# Patient Record
Sex: MALE | Race: WHITE | NOT HISPANIC OR LATINO | Employment: OTHER | URBAN - METROPOLITAN AREA
[De-identification: names, ages, dates, MRNs, and addresses within clinical notes are randomized per-mention and may not be internally consistent; named-entity substitution may affect disease eponyms.]

---

## 2020-03-05 ENCOUNTER — APPOINTMENT (OUTPATIENT)
Dept: RADIOLOGY | Facility: MEDICAL CENTER | Age: 40
End: 2020-03-05
Attending: EMERGENCY MEDICINE
Payer: MEDICARE

## 2020-03-05 ENCOUNTER — HOSPITAL ENCOUNTER (EMERGENCY)
Facility: MEDICAL CENTER | Age: 40
End: 2020-03-05
Attending: EMERGENCY MEDICINE
Payer: MEDICARE

## 2020-03-05 VITALS
TEMPERATURE: 98.8 F | HEART RATE: 99 BPM | SYSTOLIC BLOOD PRESSURE: 135 MMHG | BODY MASS INDEX: 35.29 KG/M2 | OXYGEN SATURATION: 94 % | WEIGHT: 305 LBS | RESPIRATION RATE: 18 BRPM | HEIGHT: 78 IN | DIASTOLIC BLOOD PRESSURE: 89 MMHG

## 2020-03-05 DIAGNOSIS — J10.1 INFLUENZA A: ICD-10-CM

## 2020-03-05 LAB
ANION GAP SERPL CALC-SCNC: 13 MMOL/L (ref 0–11.9)
BASOPHILS # BLD AUTO: 0.9 % (ref 0–1.8)
BASOPHILS # BLD: 0.07 K/UL (ref 0–0.12)
BUN SERPL-MCNC: 12 MG/DL (ref 8–22)
CALCIUM SERPL-MCNC: 9.1 MG/DL (ref 8.5–10.5)
CHLORIDE SERPL-SCNC: 102 MMOL/L (ref 96–112)
CO2 SERPL-SCNC: 22 MMOL/L (ref 20–33)
CREAT SERPL-MCNC: 1.15 MG/DL (ref 0.5–1.4)
EKG IMPRESSION: NORMAL
EOSINOPHIL # BLD AUTO: 0.15 K/UL (ref 0–0.51)
EOSINOPHIL NFR BLD: 1.8 % (ref 0–6.9)
ERYTHROCYTE [DISTWIDTH] IN BLOOD BY AUTOMATED COUNT: 42.1 FL (ref 35.9–50)
FLUAV RNA SPEC QL NAA+PROBE: POSITIVE
FLUBV RNA SPEC QL NAA+PROBE: NEGATIVE
GLUCOSE SERPL-MCNC: 110 MG/DL (ref 65–99)
HCT VFR BLD AUTO: 44.1 % (ref 42–52)
HGB BLD-MCNC: 14.6 G/DL (ref 14–18)
LYMPHOCYTES # BLD AUTO: 2.95 K/UL (ref 1–4.8)
LYMPHOCYTES NFR BLD: 36 % (ref 22–41)
MANUAL DIFF BLD: NORMAL
MCH RBC QN AUTO: 28.9 PG (ref 27–33)
MCHC RBC AUTO-ENTMCNC: 33.1 G/DL (ref 33.7–35.3)
MCV RBC AUTO: 87.3 FL (ref 81.4–97.8)
MONOCYTES # BLD AUTO: 1.11 K/UL (ref 0–0.85)
MONOCYTES NFR BLD AUTO: 13.5 % (ref 0–13.4)
MORPHOLOGY BLD-IMP: NORMAL
NEUTROPHILS # BLD AUTO: 3.92 K/UL (ref 1.82–7.42)
NEUTROPHILS NFR BLD: 47.8 % (ref 44–72)
NRBC # BLD AUTO: 0 K/UL
NRBC BLD-RTO: 0 /100 WBC
PLATELET # BLD AUTO: 197 K/UL (ref 164–446)
PLATELET BLD QL SMEAR: NORMAL
PMV BLD AUTO: 9.9 FL (ref 9–12.9)
POTASSIUM SERPL-SCNC: 3.7 MMOL/L (ref 3.6–5.5)
RBC # BLD AUTO: 5.05 M/UL (ref 4.7–6.1)
RBC BLD AUTO: PRESENT
SODIUM SERPL-SCNC: 137 MMOL/L (ref 135–145)
VARIANT LYMPHS BLD QL SMEAR: NORMAL
WBC # BLD AUTO: 8.2 K/UL (ref 4.8–10.8)

## 2020-03-05 PROCEDURE — 93005 ELECTROCARDIOGRAM TRACING: CPT

## 2020-03-05 PROCEDURE — 99284 EMERGENCY DEPT VISIT MOD MDM: CPT

## 2020-03-05 PROCEDURE — 85027 COMPLETE CBC AUTOMATED: CPT

## 2020-03-05 PROCEDURE — 85007 BL SMEAR W/DIFF WBC COUNT: CPT

## 2020-03-05 PROCEDURE — 80048 BASIC METABOLIC PNL TOTAL CA: CPT

## 2020-03-05 PROCEDURE — 700101 HCHG RX REV CODE 250: Performed by: EMERGENCY MEDICINE

## 2020-03-05 PROCEDURE — 94640 AIRWAY INHALATION TREATMENT: CPT

## 2020-03-05 PROCEDURE — 87502 INFLUENZA DNA AMP PROBE: CPT

## 2020-03-05 PROCEDURE — 36415 COLL VENOUS BLD VENIPUNCTURE: CPT

## 2020-03-05 PROCEDURE — 700105 HCHG RX REV CODE 258: Performed by: EMERGENCY MEDICINE

## 2020-03-05 PROCEDURE — 71046 X-RAY EXAM CHEST 2 VIEWS: CPT

## 2020-03-05 PROCEDURE — 93005 ELECTROCARDIOGRAM TRACING: CPT | Performed by: EMERGENCY MEDICINE

## 2020-03-05 RX ORDER — LISINOPRIL 20 MG/1
20 TABLET ORAL DAILY
Qty: 30 TAB | Refills: 0 | Status: SHIPPED | OUTPATIENT
Start: 2020-03-05

## 2020-03-05 RX ORDER — SODIUM CHLORIDE 9 MG/ML
1000 INJECTION, SOLUTION INTRAVENOUS ONCE
Status: COMPLETED | OUTPATIENT
Start: 2020-03-05 | End: 2020-03-05

## 2020-03-05 RX ORDER — ALBUTEROL SULFATE 90 UG/1
2 AEROSOL, METERED RESPIRATORY (INHALATION) EVERY 6 HOURS PRN
Qty: 8.5 G | Refills: 0 | Status: SHIPPED | OUTPATIENT
Start: 2020-03-05

## 2020-03-05 RX ORDER — IPRATROPIUM BROMIDE AND ALBUTEROL SULFATE 2.5; .5 MG/3ML; MG/3ML
6 SOLUTION RESPIRATORY (INHALATION) ONCE
Status: COMPLETED | OUTPATIENT
Start: 2020-03-05 | End: 2020-03-05

## 2020-03-05 RX ADMIN — IPRATROPIUM BROMIDE AND ALBUTEROL SULFATE 6 ML: .5; 3 SOLUTION RESPIRATORY (INHALATION) at 02:58

## 2020-03-05 RX ADMIN — SODIUM CHLORIDE 1000 ML: 9 INJECTION, SOLUTION INTRAVENOUS at 02:45

## 2020-03-05 ASSESSMENT — LIFESTYLE VARIABLES: DO YOU DRINK ALCOHOL: NO

## 2020-03-05 NOTE — ED NOTES
Pt discharged. Patient provided information on influenza. Pt educated to establish a PCP and to come back to the hospital with any worsening symptoms. Pt provided with prescriptions and wheeled to lobby.

## 2020-03-05 NOTE — ED TRIAGE NOTES
"Chief Complaint   Patient presents with   • Shortness of Breath     40 yo male ambulatory with cane to triage for above complaint. Pt states SOB x 2 hrs, non productive dry cough noted, denies chest pain, denies fevers. States he was treated for PNA x 1 month ago, currently smokes 1 PPD of cigarettes. Mild increased WOB noted, airway intact, pt speaking in full and complete sentences.     Educated on triage process, encourage to inform staff of any changes.     /112   Pulse (!) 110   Temp 36.9 °C (98.4 °F)   Resp 18   Ht 1.981 m (6' 6\")   Wt (!) 138.3 kg (305 lb)   SpO2 95%   BMI 35.25 kg/m²   "

## 2020-03-05 NOTE — ED NOTES
Pt complaining of SOB and requesting oxygen. Pt sating at 97% and speaking full sentences. Placed pt on 1.5L. No other needs at this time.

## 2020-03-05 NOTE — DISCHARGE INSTRUCTIONS
You were seen in the Emergency Department for viral illness.    Labs were reassuring.  Chest x-ray was normal.  Influenza testing was positive.    Please use tylenol or ibuprofen every 6 hours as needed for pain/fever.  Encourage fluid intake.    Please follow up with your primary care physician.    Return to the Emergency Department with persistent fevers greater than 100.4 for greater than 4-5 days, trouble breathing, persistent vomiting, decreased urine output, or other concerns.

## 2020-03-05 NOTE — ED NOTES
IV placed, labs drawn and sent, IV fluids started, flu swab sent, and RT notified about breathing treatment. No other needs at this time.

## 2020-03-05 NOTE — ED PROVIDER NOTES
"ED Provider Note    Scribed for Charlotte Ayala M.D. by Gab Alanis. 3/5/2020  2:28 AM    Means of arrival: walk in  History obtained from: patient  History limited by: none      CHIEF COMPLAINT  Chief Complaint   Patient presents with   • Shortness of Breath       HPI  Carlos Martin is a 39 y.o. male who presents to the Emergency Department for shortness of breath onset last night. He reports associated sweats, subjective fever and cough. He denies any chest pain. He denies any recent exposures to sick contacts. The patient recently traveled here from [place] but denies any other travel. He notes a history of hypertension and smoking. He additionally notes that he had pneumonia 6 weeks ago and had his symptoms resolve with treatment.    REVIEW OF SYSTEMS  Pertinent positive include shortness of breath, sweats, cough. Pertinent negative include no chest pain. All other systems reviewed and are negative.    PAST MEDICAL HISTORY   Patient reports history of hypertension    SOCIAL HISTORY  Social History     Tobacco Use   • Smoking status: Current Every Day Smoker     Packs/day: 1.00   • Smokeless tobacco: Never Used   Substance and Sexual Activity   • Alcohol use: Not Currently   • Drug use: Not Currently   • Sexual activity: None noted       SURGICAL HISTORY  patient denies any surgical history    CURRENT MEDICATIONS  Home Medications    **Home medications have not yet been reviewed for this encounter**         ALLERGIES  No Known Allergies    PHYSICAL EXAM   VITAL SIGNS: /112   Pulse (!) 110   Temp 36.9 °C (98.4 °F)   Resp 18   Ht 1.981 m (6' 6\")   Wt (!) 138.3 kg (305 lb)   SpO2 95%   BMI 35.25 kg/m²    Constitutional: Nontoxic appearing middle-aged male.  Alert in no apparent distress.  HENT: Normocephalic, Atraumatic. Bilateral external ears normal. Nose normal.  Moist mucous membranes.  Oropharynx clear.  Eyes: Pupils are equal and reactive. Conjunctiva normal.   Neck: Supple, full range " of motion  Heart: Regular rate and rhythm.  No murmurs.    Lungs: Trace expiratory wheeze, worse on left than right.  Abdomen Soft, no distention.  No tenderness to palpation.  Musculoskeletal: Atraumatic. No obvious deformities noted.  No lower extremity edema.  Skin: Warm, Dry.  No erythema, No rash.   Neurologic: Alert and oriented x3. Moving all extremities spontaneously without focal deficits.  Psychiatric: Affect normal, Mood normal, Appears appropriate and not intoxicated.    Proper droplet and airborne respiratory precautions were worn during exam, including N95 mask.    DIAGNOSTIC STUDIES    EKG  Results for orders placed or performed during the hospital encounter of 20   EKG   Result Value Ref Range    Report       [facility] Emergency Dept.    Test Date:  2020  Pt Name:    FRED LOPEZ                  Department: ER  MRN:        [id number]                      Room:        13  Gender:     Male                         Technician: [id number]  :        1980                   Requested By:ER TRIAGE PROTOCOL  Order #:    [id number]                    Reading MD: Charlotte Ayala MD    Measurements  Intervals                                Axis  Rate:       102                          P:          63  CT:         184                          QRS:        -22  QRSD:       98                           T:          47  QT:         336  QTc:        438    Interpretive Statements  SINUS TACHYCARDIA  BORDERLINE LEFT AXIS DEVIATION  No ectopy or hypertrophy  No ST or T wave change  No previous ECG available for comparison  Electronically Signed On 3-5-2020 2:35:13 PST by Charlotte Ayala MD             LABS  Personally reviewed by me  Labs Reviewed   CBC WITH DIFFERENTIAL - Abnormal; Notable for the following components:       Result Value    MCHC 33.1 (*)     Monocytes 13.50 (*)     Monos (Absolute) 1.11 (*)     All other components within normal limits   BASIC METABOLIC PANEL - Abnormal;  Notable for the following components:    Glucose 110 (*)     Anion Gap 13.0 (*)     All other components within normal limits   INFLUENZA A/B BY PCR - Abnormal; Notable for the following components:    Influenza virus A RNA POSITIVE (*)     All other components within normal limits   PERIPHERAL SMEAR REVIEW   PLATELET ESTIMATE   MORPHOLOGY   DIFFERENTIAL MANUAL   ESTIMATED GFR       RADIOLOGY  Personally reviewed by me  DX-CHEST-2 VIEWS   Final Result      No acute cardiopulmonary disease.            ED COURSE  Vitals:    03/05/20 0401 03/05/20 0417 03/05/20 0431 03/05/20 0501   BP: 142/96  134/94 135/89   Pulse: 93  97 99   Resp: 20 18 18   Temp:    37.1 °C (98.8 °F)   TempSrc:    Temporal   SpO2: 96% 96% 96% 94%   Weight:       Height:             Medications administered:  Medications   ipratropium-albuterol (DUONEB) nebulizer solution (6 mL Nebulization Given 3/5/20 0258)   NS infusion 1,000 mL (0 mL Intravenous Stopped 3/5/20 0404)     Patient was given IV fluids for tachycardia and possible dehydration.  IV hydration was used because oral hydration was not adequate alone.  Following fluid administration patient's symptoms and hydration status were improved.      2:28 AM Patient seen and examined at bedside. The patient presents with cough and shortness of breath. Ordered for DX chest, influenza A/B by PCR, BMP, CBC w/, EKG to evaluate. Patient will be treated with duo neb, NS infusion 1 L for his symptoms.       MEDICAL DECISION MAKING  Patient with history of tobacco abuse presents with 1 day history of cough and shortness of breath as well as flulike symptoms.  He is afebrile on arrival, tachycardic and hypertensive.  He has no signs of respiratory distress however some minimal wheezing on exam.  Clinically doubt ACS or pulmonary embolism.  Labs are reassuring without signs of sepsis.  Chest x-ray is negative for pneumonia, pneumothorax, pulmonary edema.  Influenza testing did return positive.    Upon  reassessment, patient is resting comfortably with normal vital signs.  No new complaints at this time.  Discussed results with patient and/or family as well as instructions on symptomatic care for viral illness as well as importance of primary care follow up.  Patient understands plan of care and strict return precautions for new or changing symptoms.       The patient is referred to a primary physician for blood pressure management, diabetic screening, and for all other preventative health concerns.    DISPOSITION:  Patient will be discharged home in stable condition.    FOLLOW UP:  Yaa Esqueda M.D.  [address] [facility] [address]  [facility] [address]  [phone]    Call   to establish primary care physician    [facility], Emergency Dept  [address] [address] [phone]    If symptoms worsen      OUTPATIENT MEDICATIONS:  Discharge Medication List as of 3/5/2020  5:08 AM      START taking these medications    Details   lisinopril (PRINIVIL) 20 MG Tab Take 1 Tab by mouth every day., Disp-30 Tab, R-0, Print Rx Paper      albuterol 108 (90 Base) MCG/ACT Aero Soln inhalation aerosol Inhale 2 Puffs by mouth every 6 hours as needed for Shortness of Breath., Disp-8.5 g, R-0, Print Rx Paper               IMPRESSION  (J10.1) Influenza A    Results, diagnoses, and treatment options were discussed with the patient and/or family. Patient verbalized understanding of plan of care.       Gab SALAZAR (Bartolo), am scribing for, and in the presence of, Charlotte Ayala M.D..    Electronically signed by: Gab Alanis (Bartolo), 3/5/2020    Charlotte SALAZAR M.D. personally performed the services described in this documentation, as scribed by Gab Alanis in my presence, and it is both accurate and complete. C    The note accurately reflects work and decisions made by me.  Charlotte Ayala M.D.  3/6/2020  3:12 AM

## 2021-09-19 ENCOUNTER — HOSPITAL ENCOUNTER (OUTPATIENT)
Dept: OCCUPATIONAL THERAPY | Facility: HOSPITAL | Age: 41
End: 2021-09-20
Attending: SURGERY | Admitting: SURGERY

## 2021-09-20 LAB
ABS NEUT (OLG): 5.19 X10(3)/MCL (ref 2.1–9.2)
BASOPHILS # BLD AUTO: 0 X10(3)/MCL (ref 0–0.2)
BASOPHILS NFR BLD AUTO: 0 %
BUN SERPL-MCNC: 13.5 MG/DL (ref 8.9–20.6)
CALCIUM SERPL-MCNC: 9.1 MG/DL (ref 8.4–10.2)
CHLORIDE SERPL-SCNC: 103 MMOL/L (ref 98–107)
CO2 SERPL-SCNC: 27 MMOL/L (ref 22–29)
CREAT SERPL-MCNC: 1.05 MG/DL (ref 0.73–1.18)
CREAT/UREA NIT SERPL: 13
EOSINOPHIL # BLD AUTO: 0.4 X10(3)/MCL (ref 0–0.9)
EOSINOPHIL NFR BLD AUTO: 4 %
ERYTHROCYTE [DISTWIDTH] IN BLOOD BY AUTOMATED COUNT: 13.3 % (ref 11.5–17)
GLUCOSE SERPL-MCNC: 92 MG/DL (ref 74–100)
HCT VFR BLD AUTO: 47 % (ref 42–52)
HGB BLD-MCNC: 14.3 GM/DL (ref 14–18)
LYMPHOCYTES # BLD AUTO: 3 X10(3)/MCL (ref 0.6–4.6)
LYMPHOCYTES NFR BLD AUTO: 31 %
MCH RBC QN AUTO: 28.6 PG (ref 27–31)
MCHC RBC AUTO-ENTMCNC: 30.4 GM/DL (ref 33–36)
MCV RBC AUTO: 94 FL (ref 80–94)
MONOCYTES # BLD AUTO: 1 X10(3)/MCL (ref 0.1–1.3)
MONOCYTES NFR BLD AUTO: 10 %
NEUTROPHILS # BLD AUTO: 5.19 X10(3)/MCL (ref 2.1–9.2)
NEUTROPHILS NFR BLD AUTO: 54 %
PLATELET # BLD AUTO: 283 X10(3)/MCL (ref 130–400)
PMV BLD AUTO: 10.1 FL (ref 9.4–12.4)
POTASSIUM SERPL-SCNC: 4.6 MMOL/L (ref 3.5–5.1)
RBC # BLD AUTO: 5 X10(6)/MCL (ref 4.7–6.1)
SODIUM SERPL-SCNC: 139 MMOL/L (ref 136–145)
WBC # SPEC AUTO: 9.7 X10(3)/MCL (ref 4.5–11.5)

## 2021-10-14 ENCOUNTER — HISTORICAL (OUTPATIENT)
Dept: ADMINISTRATIVE | Facility: HOSPITAL | Age: 41
End: 2021-10-14

## 2021-11-24 ENCOUNTER — HISTORICAL (OUTPATIENT)
Dept: SURGERY | Facility: HOSPITAL | Age: 41
End: 2021-11-24

## 2021-11-24 LAB — SARS-COV-2 AG RESP QL IA.RAPID: NEGATIVE

## 2022-02-08 ENCOUNTER — HISTORICAL (OUTPATIENT)
Dept: LAB | Facility: HOSPITAL | Age: 42
End: 2022-02-08

## 2022-02-08 LAB — SARS-COV-2 AG RESP QL IA.RAPID: NEGATIVE

## 2022-02-22 ENCOUNTER — HISTORICAL (OUTPATIENT)
Dept: LAB | Facility: HOSPITAL | Age: 42
End: 2022-02-22

## 2022-02-22 LAB — SARS-COV-2 AG RESP QL IA.RAPID: NEGATIVE

## 2022-02-23 ENCOUNTER — HISTORICAL (OUTPATIENT)
Dept: SURGERY | Facility: HOSPITAL | Age: 42
End: 2022-02-23

## 2022-04-10 ENCOUNTER — HISTORICAL (OUTPATIENT)
Dept: ADMINISTRATIVE | Facility: HOSPITAL | Age: 42
End: 2022-04-10
Payer: MEDICAID

## 2022-04-15 ENCOUNTER — HISTORICAL (OUTPATIENT)
Dept: ADMINISTRATIVE | Facility: HOSPITAL | Age: 42
End: 2022-04-15

## 2022-04-27 VITALS
HEIGHT: 78 IN | BODY MASS INDEX: 36.45 KG/M2 | SYSTOLIC BLOOD PRESSURE: 162 MMHG | WEIGHT: 315 LBS | DIASTOLIC BLOOD PRESSURE: 119 MMHG

## 2022-04-30 NOTE — OP NOTE
DATE OF SURGERY:    09/20/2021    SURGEON:  Albert Anton Jr., MD    RESIDENT SURGEON:  Dr. Elyssa Alston    PREOPERATIVE DIAGNOSIS:  Incarcerated umbilical hernia.    POSTOPERATIVE DIAGNOSIS:  Fat-containing incarcerated umbilical hernia.    PROCEDURE:  Primary repair of umbilical hernia.    ANESTHESIA:  General endotracheal.    COMPLICATIONS:  None.    CONDITION:  Stable.    SPECIMEN:  Hernia sac and omentum or fat.    ESTIMATED BLOOD LOSS:  Minimal.    FINDINGS:  The patient had an incarcerated hernia with subcutaneous fat and some omentum.  No small bowel was seen.  It was approximately 3 cm inside.    PROCEDURE IN DETAIL:  After appropriate consents were obtained, the patient was brought back to the operative suite, placed in supine position, placed under general endotracheal anesthesia.  Next, the abdomen was prepped and draped in the usual sterile fashion.  At this time, a time-out was done to make sure we had the appropriate patient, appropriate operation, and appropriate preoperative medications.  Next, a supraumbilical elliptical incision was made and then this was carried down with electrocautery Bovie down to the fascia.  Next, we then dissected out around the umbilical stalk with blunt dissection with a hemostat.  Once we did this, we then disconnected the umbilical stalk from the fascia.  Next, we identified the hernia sac and opened it up and to our delight it was just omentum and no bowel and some subcutaneous fat.  Most of it was amputated and handed off as specimen.  We then began dissecting away the tissue around the defect to identify the fascial edges.  Once we did this with electrocautery Bovie, we were able to identify that the defect was approximately 3 cm in length.  Next, we dunked the rest of the omentum back down into the abdomen, and once we had all the fascial edges cleared we then used interrupted 2-0 Prolene, 8 of them, to close the fascial defect.  Once we did this, we  had a good closure that was secure.  We then reapproximated the belly button down to the fascia with a 3-0 Vicryl and then closed the skin incision with a running subcuticular 4-0 Monocryl and Dermabond for the skin.  At case end, all counts were correct.  The patient tolerated the procedure well and was ultimately transferred back to the PACU in stable condition.        ______________________________  MD SULAIMAN Patino Jr./NANCY  DD:  09/20/2021  Time:  08:48AM  DT:  09/20/2021  Time:  09:51AM  Job #:  [id number]

## 2022-05-03 NOTE — HISTORICAL [facility] CERNER
This is a historical note converted from Cered. Formatting and pictures may have been removed.  Please reference Cered for original formatting and attached multimedia. Admit and Discharge Dates  Admit Date: 09/19/2021  Discharge Date: 09/20/2021  Physicians  Attending Physician - Jolie JOHNSON MD, Elie DELUNA  Admitting Physician - Jolie JOHNSON MD, Elie DELUNA  Primary Care Physician - Gary Montenegro MD  Discharge Diagnosis  Hernia?[id number]  Irreducible umbilical hernia?K42.0  Transfer?[id number]  Surgical Procedures  09/20/2021 - [id number] - Hernia Repair Umbilical  Immunizations  No immunizations recorded for this visit.  Admission Information  40yo M w/ hx of HTN and hypothyroidism, and previous appendectomy who presents as transfer from OSH for strangulated umbilical hernia. States that he first noticed pain and a small knot at the umbilicus 2 days ago and the pain has since worsened at the lump has increased in size. He also states that today he noticed color changes over the lump as well. He denies fevers, chills, n/v, decrease in appetite, melena, hematochezia, diarrhea. His last BM was 2 days ago which is not normal for him. He has never had pain like this in the past and has never noticed a reducible hernia at the umbilicus either. CT imaging shows strangulated fat containing umbilical hernia with hernia measuring approx 4cm and neck of hernia measuring 2cm. His lactate is WNL and labs are largely unremarkable. Multiple attempts using pain control, anxiolytics, ice packs, as well as Trendelenburg positioning however patient was unable to tolerate attempts at reduction of hernia.  Hospital Course  Patient was admitted to the acute care service with surgery for strangulated umbilical hernia. He underwent a umbilical hernia repair. Patient did not undergo any complications.  Patient was discharged the same day as her surgery.  Significant Findings  Strangulated  umbilical hernia  Time Spent on discharge  <30min  Objective  Vitals & Measurements  T:?36.3? ?C (Oral)? TMIN:?36.3? ?C (Oral)? TMAX:?36.9? ?C (Oral)? HR:?73(Monitored)? RR:?17? BP:?110/77? SpO2:?94%? WT:?165?kg? BMI:?42.09?  Physical Exam  NAD  RR  Breathing comfortably on room air  abdomen obese, soft, ND, small palpable hernia at the umbilicus with skin discoloration present and significantly TTP. Hernia defect palpable and approx 2 cm.  moves all extremities  alert and oriented x3  Patient Discharge Condition  stable  Discharge Disposition  home  ?  I. Lynn Ventura MD  HO-1 General Surgery   Discharge Medication Reconciliation  Discharge Med Rec is not complete  Car Seat Challenge  No Qualifying Data

## 2022-05-03 NOTE — HISTORICAL [facility] CERNER
This is a historical note converted from Bebo. Formatting and pictures may have been removed.  Please reference Bebo for original formatting and attached multimedia. Chief Complaint  [facility] [place]  History of Present Illness  42yo M w/ hx of HTN and hypothyroidism, and previous appendectomy who presents as transfer from OSH for strangulated umbilical hernia. States that he first noticed pain and a small knot at the umbilicus 2 days ago and the pain has since worsened at the lump has increased in size. He also states that today he noticed color changes over the lump as well. He denies fevers, chills, n/v, decrease in appetite, melena, hematochezia, diarrhea. His last BM was 2 days ago which is not normal for him. He has never had pain like this in the past and has never noticed a reducible hernia at the umbilicus either. CT imaging shows strangulated fat containing umbilical hernia with hernia measuring approx 4cm and neck of hernia measuring 2cm. His lactate is WNL and labs are largely unremarkable. Multiple attempts using pain control, anxiolytics, ice packs, as well as Trendelenburg positioning however patient was unable to tolerate attempts at reduction of hernia.  Review of Systems  Negative except for HPI  Physical Exam  Vitals & Measurements  T:?36.9? ?C (Oral)? HR:?82(Peripheral)? HR:?83(Monitored)? RR:?15? BP:?156/108? SpO2:?96%? WT:?165?kg?  NAD  RR  Breathing comfortably on room air  abdomen obese, soft, ND, small palpable hernia at the umbilicus with skin discoloration present and significantly TTP. Hernia defect palpable and approx 2 cm.  moves all extremities  alert and oriented x3  Assessment/Plan  Hernia?[id number]  ?  40 yo M w/ strangulated fat containing umbilical hernia, unable to tolerate reduction or discharge home with pain control  - admit to general surgery service for observation  - will plan for OR on 9/20 for umbilical hernia repair. r/b/a discussed w/ patient, all  questions answered and willing to proceed  - CLD, NPO midnight  - multimodal pain control  - zofran PRN  ?  Elyssa Alston,?MD  [employer] General Surgery PGY 2  ?  Agree with above assessment and plan. Patient seen and evaluated with team.  Fat containing strangulated umbilical hernia causing significant pain. NPO, to OR in AM for reduction/repair.   Problem List/Past Medical History  Ongoing  Headache stick  HTN - Hypertension  Historical  No qualifying data  Procedure/Surgical History  appendectomy  Tonsillectomy and adenoidectomy; age 12 or over   Medications  Inpatient  gabapentin 100 mg oral capsule, 100 mg= 1 cap(s), Oral, QID  IVF Lactated Ringers LR Bolus 1000ml 1,000 mL, 1000 mL, IV  Lactated Ringers Injection intravenous solution 1,000 mL, 1000 mL, IV  morphine, 2 mg= 0.5 mL, IV Push, q2hr, PRN  oxyCODONE, 5 mg= 1 tab(s), Oral, q4hr, PRN  oxyCODONE, 10 mg= 2 tab(s), Oral, q4hr, PRN  Robaxin, 500 mg= 1 tab(s), Oral, TID  Toradol, 30 mg= 1 mL, IV Push, [id number]  Tylenol, 650 mg= 2 tab(s), Oral, q4hr, PRN  Zofran, 4 mg= 2 mL, IV Push, q4hr, PRN  Zofran 2 mg/mL injectable solution, 4 mg= 2 mL, IV Push, q4hr, PRN  Home  Abilify 5 mg oral tablet  levothyroxine 125 mcg (0.125 mg) oral tablet, 125 mcg= 1 tab(s), Oral, Daily  lisinopril 40 mg oral tablet, 40 mg= 1 tab(s), Oral, Daily  propranolol 10 mg oral tablet, 10 mg= 1 tab(s), Oral, BID  Strattera 100 mg oral capsule, 100 mg= 1 cap(s), Oral, qAM  Trileptal 600 mg oral tablet, 1200 mg, Oral, BID  Allergies  No Known Medication Allergies  Social History  Abuse/Neglect  No, 09/19/2021  No, 05/07/2021  Alcohol  Past, 06/13/2021  Current, Liquor, 1-2 times per month, 10/06/2018  Substance Use  Never, 10/06/2018  Tobacco  10 or more cigarettes (1/2 pack or more)/day in last 30 days, Cigarettes, N/A, 09/19/2021  10 or more cigarettes (1/2 pack or more)/day in last 30 days, No, 05/07/2021  Lab Results  Labs Last 24 Hours?  No qualifying data available.

## 2022-05-03 NOTE — HISTORICAL [facility] CERNER
This is a historical note converted from Bebo. Formatting and pictures may have been removed.  Please reference Bebo for original formatting and attached multimedia. Procedure Name  1. Right L5?Transforaminal Epidural Steroid Injection  2. Right S1 Transforaminal Epidural Steroid Injection  ?  Pre-Procedure Diagnoses:  1. Chronic pain syndrome  2. Low back pain  3. Lumbar radiculopathy  4. Lumbar disc displacement  5. Lumbar degenerative disc disease  ?   Post-Procedure Diagnoses:  1. Chronic pain syndrome  2. Low back pain  3. Lumbar radiculopathy  4. Lumbar disc displacement  5. Lumbar degenerative disc disease  ?   Anesthesia:  Local and MAC  ?   Estimated Blood Loss:  None  ?  Complications:  None  ?  Informed Consent:  The procedure, risks, benefits, and alternatives were discussed with the patient.? There were no contraindications to the procedure.? The patient expressed understanding and agreed to proceed.? Fully informed written consent was obtained.?  ?  Description of the Procedure:  The patient was taken to the operating room.? IV access was obtained prior to the start of the procedure.? The patient was positioned prone on the fluoroscopy table.? Continuous hemodynamic monitoring was initiated and continued throughout the duration of the procedure.? The skin overlying the lumbosacral spine was prepped with Chloraprep and draped into a sterile field.? An oblique fluoroscopic view was obtained on the right side at L5, with the superior articular process of the sacral ala aligned with the pedicle.? Skin anesthesia was achieved using 1 mL of 1% lidocaine.? A 22-gauge 5-inch Quinke spinal needle was slowly inserted and advanced towards the 6 oclock position of the pedicle and into the epidural space.? Proper needle position was confirmed under AP, oblique, and lateral fluoroscopic views.? Negative aspiration for blood or CSF was confirmed.? 0.5 mL of Isovue contrast was injected.? Fluoroscopic imaging  revealed a clear outline of the spinal nerve with proximal spread of agent through the neural foramen and into the epidural space.? Then a combination of 5 mg of dexamethasone and 1 mL of 0.25% bupivacaine was easily injected.? There was no pain on injection.? The needle was removed intact and bleeding was nil.? The same procedure was repeated in identical fashion on the right side at S1. Sterile bandages were applied.? The patient was taken to the recovery room for further observation in stable condition.? The patient was then discharged home without any complications.

## 2022-09-14 NOTE — PROCEDURES
Procedure Name  1. Left L4?Transforaminal Epidural Steroid Injection  2. Left L5 Transforaminal Epidural Steroid Injection  ?  Pre-Procedure Diagnoses:  1. Chronic pain syndrome  2. Low back pain  3. Lumbar radiculopathy  4. Lumbar disc displacement  5. Lumbar degenerative disc disease  ?   Post-Procedure Diagnoses:  1. Chronic pain syndrome  2. Low back pain  3. Lumbar radiculopathy  4. Lumbar disc displacement  5. Lumbar degenerative disc disease  ?   Anesthesia:  Local and MAC  ?   Estimated Blood Loss:  None  ?  Complications:  None  ?  Informed Consent:  The procedure, risks, benefits, and alternatives were discussed with the patient.? There were no contraindications to the procedure.? The patient expressed understanding and agreed to proceed.? Fully informed written consent was obtained.?  ?  Description of the Procedure:  The patient was taken to the operating room.? IV access was obtained prior to the start of the procedure.? The patient was positioned prone on the fluoroscopy table.? Continuous hemodynamic monitoring was initiated and continued throughout the duration of the procedure.? The skin overlying the lumbosacral spine was prepped with Chloraprep and draped into a sterile field.? An oblique fluoroscopic view was obtained on the left side at?L4, with the superior articular process of the inferior vertebral body aligned with the pedicle.? Skin anesthesia was achieved using 1 mL of 1% lidocaine.? A 22-gauge 7-inch Quinke spinal needle was slowly inserted and advanced towards the 6 oclock position of the pedicle and into the epidural space.? Proper needle position was confirmed under AP, oblique, and lateral fluoroscopic views.? Negative aspiration for blood or CSF was confirmed.? 0.5 mL of Isovue contrast was injected.? Fluoroscopic imaging revealed a clear outline of the spinal nerve with proximal spread of agent through the neural foramen and into the epidural space.? Then a combination of 5 mg of  dexamethasone and 1 mL of 0.25% bupivacaine was easily injected.? There was no pain on injection.? The needle was removed intact and bleeding was nil.? The same procedure was repeated in identical fashion on the left side at L5. Sterile bandages were applied.? The patient was taken to the recovery room for further observation in stable condition.? The patient was then discharged home without any complications.

## 2022-11-10 ENCOUNTER — PATIENT MESSAGE (OUTPATIENT)
Dept: HEALTH INFORMATION MANAGEMENT | Facility: OTHER | Age: 42
End: 2022-11-10

## 2022-12-01 ENCOUNTER — HOSPITAL ENCOUNTER (EMERGENCY)
Facility: HOSPITAL | Age: 42
Discharge: HOME | End: 2022-12-01
Attending: STUDENT IN AN ORGANIZED HEALTH CARE EDUCATION/TRAINING PROGRAM
Payer: MEDICARE

## 2022-12-01 VITALS
RESPIRATION RATE: 18 BRPM | OXYGEN SATURATION: 95 % | BODY MASS INDEX: 36.45 KG/M2 | HEART RATE: 81 BPM | WEIGHT: 315 LBS | TEMPERATURE: 98 F | HEIGHT: 78 IN | DIASTOLIC BLOOD PRESSURE: 93 MMHG | SYSTOLIC BLOOD PRESSURE: 146 MMHG

## 2022-12-01 DIAGNOSIS — J18.9 ATYPICAL PNEUMONIA: Primary | ICD-10-CM

## 2022-12-01 DIAGNOSIS — R07.9 CHEST PAIN: ICD-10-CM

## 2022-12-01 LAB
ALBUMIN SERPL-MCNC: 4.1 GM/DL (ref 3.5–5)
ALBUMIN/GLOB SERPL: 1.3 RATIO (ref 1.1–2)
ALP SERPL-CCNC: 74 UNIT/L (ref 40–150)
ALT SERPL-CCNC: 16 UNIT/L (ref 0–55)
AST SERPL-CCNC: 24 UNIT/L (ref 5–34)
BASOPHILS # BLD AUTO: 0.03 X10(3)/MCL (ref 0–0.2)
BASOPHILS NFR BLD AUTO: 0.3 %
BILIRUBIN DIRECT+TOT PNL SERPL-MCNC: 0.6 MG/DL
BNP BLD-MCNC: <10 PG/ML
BUN SERPL-MCNC: 10.8 MG/DL (ref 8.9–20.6)
CALCIUM SERPL-MCNC: 9.4 MG/DL (ref 8.4–10.2)
CHLORIDE SERPL-SCNC: 107 MMOL/L (ref 98–107)
CO2 SERPL-SCNC: 26 MMOL/L (ref 22–29)
CREAT SERPL-MCNC: 0.86 MG/DL (ref 0.73–1.18)
EOSINOPHIL # BLD AUTO: 0.21 X10(3)/MCL (ref 0–0.9)
EOSINOPHIL NFR BLD AUTO: 2 %
ERYTHROCYTE [DISTWIDTH] IN BLOOD BY AUTOMATED COUNT: 14.1 % (ref 11.5–17)
FLUAV AG UPPER RESP QL IA.RAPID: NOT DETECTED
FLUBV AG UPPER RESP QL IA.RAPID: NOT DETECTED
GFR SERPLBLD CREATININE-BSD FMLA CKD-EPI: >60 MLS/MIN/1.73/M2
GLOBULIN SER-MCNC: 3.2 GM/DL (ref 2.4–3.5)
GLUCOSE SERPL-MCNC: 84 MG/DL (ref 74–100)
HCT VFR BLD AUTO: 48.7 % (ref 42–52)
HGB BLD-MCNC: 15.3 GM/DL (ref 14–18)
IMM GRANULOCYTES # BLD AUTO: 0.06 X10(3)/MCL (ref 0–0.04)
IMM GRANULOCYTES NFR BLD AUTO: 0.6 %
LYMPHOCYTES # BLD AUTO: 2.21 X10(3)/MCL (ref 0.6–4.6)
LYMPHOCYTES NFR BLD AUTO: 21.1 %
MCH RBC QN AUTO: 28.2 PG (ref 27–31)
MCHC RBC AUTO-ENTMCNC: 31.4 MG/DL (ref 33–36)
MCV RBC AUTO: 89.7 FL (ref 80–94)
MONOCYTES # BLD AUTO: 1.1 X10(3)/MCL (ref 0.1–1.3)
MONOCYTES NFR BLD AUTO: 10.5 %
NEUTROPHILS # BLD AUTO: 6.8 X10(3)/MCL (ref 2.1–9.2)
NEUTROPHILS NFR BLD AUTO: 65.5 %
NRBC BLD AUTO-RTO: 0 %
PLATELET # BLD AUTO: 261 X10(3)/MCL (ref 130–400)
PMV BLD AUTO: 10 FL (ref 7.4–10.4)
POTASSIUM SERPL-SCNC: 4.2 MMOL/L (ref 3.5–5.1)
PROT SERPL-MCNC: 7.3 GM/DL (ref 6.4–8.3)
RBC # BLD AUTO: 5.43 X10(6)/MCL (ref 4.7–6.1)
SARS-COV-2 RNA RESP QL NAA+PROBE: NOT DETECTED
SODIUM SERPL-SCNC: 140 MMOL/L (ref 136–145)
TROPONIN I SERPL-MCNC: <0.01 NG/ML (ref 0–0.04)
WBC # SPEC AUTO: 10.5 X10(3)/MCL (ref 4.5–11.5)

## 2022-12-01 PROCEDURE — 80053 COMPREHEN METABOLIC PANEL: CPT | Performed by: PHYSICIAN ASSISTANT

## 2022-12-01 PROCEDURE — 93005 ELECTROCARDIOGRAM TRACING: CPT

## 2022-12-01 PROCEDURE — 84484 ASSAY OF TROPONIN QUANT: CPT | Performed by: PHYSICIAN ASSISTANT

## 2022-12-01 PROCEDURE — 0240U COVID/FLU A&B PCR: CPT | Performed by: PHYSICIAN ASSISTANT

## 2022-12-01 PROCEDURE — 83880 ASSAY OF NATRIURETIC PEPTIDE: CPT | Performed by: PHYSICIAN ASSISTANT

## 2022-12-01 PROCEDURE — 85025 COMPLETE CBC W/AUTO DIFF WBC: CPT | Performed by: PHYSICIAN ASSISTANT

## 2022-12-01 PROCEDURE — 99285 EMERGENCY DEPT VISIT HI MDM: CPT | Mod: 25,CS

## 2022-12-01 RX ORDER — DOXYCYCLINE 100 MG/1
100 CAPSULE ORAL 2 TIMES DAILY
Qty: 10 CAPSULE | Refills: 0 | Status: SHIPPED | OUTPATIENT
Start: 2022-12-01 | End: 2022-12-06

## 2022-12-01 RX ORDER — ALBUTEROL SULFATE 90 UG/1
2 AEROSOL, METERED RESPIRATORY (INHALATION) EVERY 6 HOURS PRN
Qty: 18 G | Refills: 0 | Status: SHIPPED | OUTPATIENT
Start: 2022-12-01

## 2022-12-01 NOTE — DISCHARGE INSTRUCTIONS
Thanks for letting us take care of you today!  It is our goal to give you courteous care and to keep you comfortable and informed, if you have any questions before you leave I will be happy to try and answer them.    Here is some advice after your visit:      Your visit in the emergency department is NOT definitive care - please follow-up with your primary care doctor and/or specialist within 1 week.  Please return if you have any worsening in your condition or if you have any other concerns.    If you had radiology exams like an XRAY or CT in the emergency Department the interpreation on them may be preliminary - there may be less time sensitive findings on the reports please obtain these reports within 24 hours from the hospital or by using your out on your mobile phone to access records.  Bring these to your primary care doctor and/or specialist for further review of incidental findings.    Please review any LAB WORK from your visit today with your primary care physician.    Please take the full course of  any ANTIBIOTICS you were prescribed - incomplete courses of antibiotics can cause resistance to antibiotics in the future which will make it difficult to treat any infections you may have.

## 2022-12-01 NOTE — ED PROVIDER NOTES
Encounter Date: 12/1/2022       History     Chief Complaint   Patient presents with    Chest Pain     Pt reports intermittent midsternal chest pain x3 days. Denies N/V, SOB & fever. Denies cardiac hx. Pt also reports cough & chills at night.      42 y.o. White male with a history of hypertension presents to Emergency Department with a chief complaint of chest pain. Symptoms began 3 days ago and have been constant since onset. Associated symptoms include productive cough and chills. The patient denies SOB, fever, abdominal pain, dizziness, or fatigue. No other reported symptoms at this time      The history is provided by the patient. No  was used.   Chest Pain  The current episode started several days ago. Chest pain occurs frequently. The chest pain is unchanged. The pain does not radiate. Primary symptoms include cough. Pertinent negatives for primary symptoms include no fever, no fatigue, no shortness of breath, no palpitations, no abdominal pain, no nausea, no vomiting, no dizziness and no altered mental status.   The cough began 3 to 5 days ago. The cough is productive. The sputum is green. It is exacerbated by smoking.   Pertinent negatives for associated symptoms include no diaphoresis, no lower extremity edema, no near-syncope, no numbness and no weakness. He tried nothing for the symptoms. Risk factors include male gender, lack of exercise, smoking/tobacco exposure, substance abuse and obesity.   His past medical history is significant for hypertension.   Review of patient's allergies indicates:  No Known Allergies  Past Medical History:   Diagnosis Date    Essential (primary) hypertension      Past Surgical History:   Procedure Laterality Date    APPENDECTOMY      HERNIA REPAIR      TONSILLECTOMY       No family history on file.  Social History     Tobacco Use    Smoking status: Every Day     Types: Vaping with nicotine    Smokeless tobacco: Never   Substance Use Topics    Alcohol use:  Not Currently    Drug use: Not Currently     Types: Methamphetamines     Review of Systems   Constitutional:  Positive for chills. Negative for diaphoresis, fatigue and fever.   Eyes:  Negative for photophobia and visual disturbance.   Respiratory:  Positive for cough. Negative for shortness of breath.    Cardiovascular:  Positive for chest pain. Negative for palpitations and near-syncope.   Gastrointestinal:  Negative for abdominal pain, nausea and vomiting.   Skin:  Negative for rash and wound.   Neurological:  Negative for dizziness, weakness and numbness.   All other systems reviewed and are negative.    Physical Exam     Initial Vitals [12/01/22 1203]   BP Pulse Resp Temp SpO2   (!) 146/93 81 18 98.3 °F (36.8 °C) 95 %      MAP       --         Physical Exam    Nursing note and vitals reviewed.  Constitutional: He appears well-developed and well-nourished. He is not diaphoretic. No distress.   HENT:   Head: Normocephalic and atraumatic.   Right Ear: External ear normal.   Left Ear: External ear normal.   Nose: Nose normal.   Mouth/Throat: Oropharynx is clear and moist. No oropharyngeal exudate.   Eyes: Conjunctivae and EOM are normal. Pupils are equal, round, and reactive to light. Right eye exhibits no discharge. Left eye exhibits no discharge.   Neck: Neck supple. No JVD present.   Normal range of motion.  Cardiovascular:  Normal rate, regular rhythm, S1 normal, S2 normal, normal heart sounds, intact distal pulses and normal pulses.    PMI is not displaced.        Pulmonary/Chest: Effort normal and breath sounds normal. No accessory muscle usage or stridor. No respiratory distress. He has no wheezes. He has no rhonchi. He has no rales. He exhibits tenderness (midsternal CP).   Abdominal: Abdomen is soft. Bowel sounds are normal. He exhibits no distension. There is no abdominal tenderness. There is no guarding.   Musculoskeletal:         General: No edema. Normal range of motion.      Cervical back: Normal  range of motion and neck supple.     Neurological: He is alert and oriented to person, place, and time. He has normal strength. No sensory deficit. GCS score is 15. GCS eye subscore is 4. GCS verbal subscore is 5. GCS motor subscore is 6.   Skin: Skin is warm and dry. Capillary refill takes less than 2 seconds. No rash noted. No erythema.   Psychiatric: He has a normal mood and affect. Thought content normal.       ED Course   Procedures  Labs Reviewed   CBC WITH DIFFERENTIAL - Abnormal; Notable for the following components:       Result Value    MCHC 31.4 (*)     IG# 0.06 (*)     All other components within normal limits   B-TYPE NATRIURETIC PEPTIDE - Normal   TROPONIN I - Normal   COVID/FLU A&B PCR - Normal    Narrative:     The Xpert Xpress SARS-CoV-2/FLU/RSV plus is a rapid, multiplexed real-time PCR test intended for the simultaneous qualitative detection and differentiation of SARS-CoV-2, Influenza A, Influenza B, and respiratory syncytial virus (RSV) viral RNA in either nasopharyngeal swab or nasal swab specimens.         CBC W/ AUTO DIFFERENTIAL    Narrative:     The following orders were created for panel order CBC auto differential.  Procedure                               Abnormality         Status                     ---------                               -----------         ------                     CBC with Differential[[id number]]        Abnormal            Final result                 Please view results for these tests on the individual orders.   COMPREHENSIVE METABOLIC PANEL     EKG Readings: (Independently Interpreted)   Rhythm: Normal Sinus Rhythm. Heart Rate: 79. Ectopy: No Ectopy. Conduction: Normal. ST Segments: Normal ST Segments. T Waves: Normal. Axis: Left Axis Deviation. Clinical Impression: Normal Sinus Rhythm     Imaging Results              X-Ray Chest PA And Lateral (Final result)  Result time 12/01/22 12:34:00      Final result by Dinah Koch MD (12/01/22 12:34:00)                    Impression:      Indeterminate opacity in the superolateral left upper lung zone.  This could be related to scarring or atypical infection.      Electronically signed by: Dinah Koch  Date:    12/01/2022  Time:    12:34               Narrative:    EXAMINATION:  XR CHEST PA AND LATERAL    CLINICAL HISTORY:  Chest pain, unspecified    TECHNIQUE:  Two views of the chest    COMPARISON:  02/05/2020    FINDINGS:  LINES AND TUBES: None    MEDIASTINUM AND FARRAH: The cardiac silhouette is normal.    LUNGS: Subtle opacity in the superolateral left upper lung zone.    PLEURA:No pleural effusion. No pneumothorax.    BONES: No acute osseous abnormality.                                       Medications - No data to display  Medical Decision Making:   Differential Diagnosis:   Chest Pain, COVID, Flu, Pneumonia   Clinical Tests:   Lab Tests: Ordered and Reviewed  The following lab test(s) were unremarkable: CBC, CMP, BNP and Troponin  Radiological Study: Ordered and Reviewed  Medical Tests: Reviewed and Ordered  ED Management:  Due to patient's complaints, ordered labs, imaging, and EKG. Labs and EKG unremarkable. Chest xray revealed atypical infection vs scarring. Since patient complaining of cough and chills, will treat for pneumonia. Patient denies new or additional complaints; no further tests indicated at this time. Verbalized understanding of instructions. No emergent or apparent distress noted prior to discharge. To follow up with PCP in 1 week as needed. Strict ER return precautions given to patient. Will prescribe Doxycycline to treat pneumonia since patient has no significant co-morbidities and Albuterol for intermittent SOB or wheezing.                           Clinical Impression:   Final diagnoses:  [R07.9] Chest pain  [J18.9] Atypical pneumonia (Primary)        ED Disposition Condition    Discharge Stable          ED Prescriptions       Medication Sig Dispense Start Date End Date Auth. Provider     doxycycline (VIBRAMYCIN) 100 MG Cap Take 1 capsule (100 mg total) by mouth 2 (two) times daily. for 5 days 10 capsule 12/1/2022 12/6/2022 Kennedi Aguillon NP    albuterol (VENTOLIN HFA) 90 mcg/actuation inhaler Inhale 2 puffs into the lungs every 6 (six) hours as needed for Wheezing or Shortness of Breath. Rescue 18 g 12/1/2022 -- Kennedi Aguillon NP          Follow-up Information       Follow up With Specialties Details Why Contact Info    [facility] - Emergency Dept Emergency Medicine Go to  If symptoms worsen, As needed [address] [place] [id number] [phone]    PCP  Call in 1 week As needed, If symptoms worsen              Kennedi Aguillon NP  12/01/22 [id number]

## 2022-12-01 NOTE — FIRST PROVIDER EVALUATION
"Medical screening examination initiated.  I have conducted a focused provider triage encounter, findings are as follows:    Chief Complaint   Patient presents with    Chest Pain     Pt reports intermittent midsternal chest pain x3 days. Denies N/V, SOB & fever. Denies cardiac hx. Pt also reports cough & chills at night.      Brief history of present illness: 42 y.o. male presents to the ED with mid sternal chest pain onset 3 days ago with associated cough and chills. Denies SOB, n/v.     Vitals:    12/01/22 1203   BP: (!) 146/93   Pulse: 81   Resp: 18   Temp: 98.3 °F (36.8 °C)   SpO2: 95%   Weight: (!) 158.8 kg (350 lb)   Height: 6' 6" (1.981 m)       Pertinent physical exam:  Awake, alert, ambulatory, non-labored respirations    Brief workup plan:  labs, EKG, CXR, swab     Preliminary workup initiated; this workup will be continued and followed by the physician or advanced practice provider that is assigned to the patient when roomed.  "

## 2022-12-05 ENCOUNTER — HOSPITAL ENCOUNTER (EMERGENCY)
Facility: HOSPITAL | Age: 42
Discharge: HOME | End: 2022-12-06
Attending: EMERGENCY MEDICINE
Payer: MEDICARE

## 2022-12-05 DIAGNOSIS — R07.9 CHEST PAIN: ICD-10-CM

## 2022-12-05 DIAGNOSIS — J20.9 ACUTE BRONCHITIS, UNSPECIFIED ORGANISM: Primary | ICD-10-CM

## 2022-12-05 LAB
ALBUMIN SERPL-MCNC: 4 GM/DL (ref 3.5–5)
ALBUMIN/GLOB SERPL: 1.3 RATIO (ref 1.1–2)
ALP SERPL-CCNC: 77 UNIT/L (ref 40–150)
ALT SERPL-CCNC: 15 UNIT/L (ref 0–55)
AST SERPL-CCNC: 21 UNIT/L (ref 5–34)
BASOPHILS # BLD AUTO: 0.04 X10(3)/MCL (ref 0–0.2)
BASOPHILS NFR BLD AUTO: 0.4 %
BILIRUBIN DIRECT+TOT PNL SERPL-MCNC: 0.6 MG/DL
BNP BLD-MCNC: <10 PG/ML
BUN SERPL-MCNC: 19.5 MG/DL (ref 8.9–20.6)
CALCIUM SERPL-MCNC: 9.6 MG/DL (ref 8.4–10.2)
CHLORIDE SERPL-SCNC: 105 MMOL/L (ref 98–107)
CO2 SERPL-SCNC: 25 MMOL/L (ref 22–29)
CREAT SERPL-MCNC: 1 MG/DL (ref 0.73–1.18)
EOSINOPHIL # BLD AUTO: 0.25 X10(3)/MCL (ref 0–0.9)
EOSINOPHIL NFR BLD AUTO: 2.4 %
ERYTHROCYTE [DISTWIDTH] IN BLOOD BY AUTOMATED COUNT: 14.3 % (ref 11.5–17)
FLUAV AG UPPER RESP QL IA.RAPID: NOT DETECTED
FLUBV AG UPPER RESP QL IA.RAPID: NOT DETECTED
GFR SERPLBLD CREATININE-BSD FMLA CKD-EPI: >60 MLS/MIN/1.73/M2
GLOBULIN SER-MCNC: 3 GM/DL (ref 2.4–3.5)
GLUCOSE SERPL-MCNC: 90 MG/DL (ref 74–100)
HCT VFR BLD AUTO: 49.2 % (ref 42–52)
HGB BLD-MCNC: 15.5 GM/DL (ref 14–18)
IMM GRANULOCYTES # BLD AUTO: 0.06 X10(3)/MCL (ref 0–0.04)
IMM GRANULOCYTES NFR BLD AUTO: 0.6 %
LYMPHOCYTES # BLD AUTO: 2.59 X10(3)/MCL (ref 0.6–4.6)
LYMPHOCYTES NFR BLD AUTO: 24.6 %
MAGNESIUM SERPL-MCNC: 1.9 MG/DL (ref 1.6–2.6)
MCH RBC QN AUTO: 28 PG (ref 27–31)
MCHC RBC AUTO-ENTMCNC: 31.5 MG/DL (ref 33–36)
MCV RBC AUTO: 89 FL (ref 80–94)
MONOCYTES # BLD AUTO: 0.97 X10(3)/MCL (ref 0.1–1.3)
MONOCYTES NFR BLD AUTO: 9.2 %
NEUTROPHILS # BLD AUTO: 6.6 X10(3)/MCL (ref 2.1–9.2)
NEUTROPHILS NFR BLD AUTO: 62.8 %
NRBC BLD AUTO-RTO: 0 %
PLATELET # BLD AUTO: 241 X10(3)/MCL (ref 130–400)
PMV BLD AUTO: 9.9 FL (ref 7.4–10.4)
POTASSIUM SERPL-SCNC: 4.3 MMOL/L (ref 3.5–5.1)
PROT SERPL-MCNC: 7 GM/DL (ref 6.4–8.3)
RBC # BLD AUTO: 5.53 X10(6)/MCL (ref 4.7–6.1)
SARS-COV-2 RNA RESP QL NAA+PROBE: NOT DETECTED
SODIUM SERPL-SCNC: 142 MMOL/L (ref 136–145)
TROPONIN I SERPL-MCNC: <0.01 NG/ML (ref 0–0.04)
WBC # SPEC AUTO: 10.5 X10(3)/MCL (ref 4.5–11.5)

## 2022-12-05 PROCEDURE — 99285 EMERGENCY DEPT VISIT HI MDM: CPT | Mod: 25

## 2022-12-05 PROCEDURE — 80053 COMPREHEN METABOLIC PANEL: CPT | Performed by: NURSE PRACTITIONER

## 2022-12-05 PROCEDURE — 93010 EKG 12-LEAD: ICD-10-PCS | Mod: ,,, | Performed by: STUDENT IN AN ORGANIZED HEALTH CARE EDUCATION/TRAINING PROGRAM

## 2022-12-05 PROCEDURE — 96374 THER/PROPH/DIAG INJ IV PUSH: CPT

## 2022-12-05 PROCEDURE — 83735 ASSAY OF MAGNESIUM: CPT | Performed by: NURSE PRACTITIONER

## 2022-12-05 PROCEDURE — 83880 ASSAY OF NATRIURETIC PEPTIDE: CPT | Performed by: NURSE PRACTITIONER

## 2022-12-05 PROCEDURE — 84484 ASSAY OF TROPONIN QUANT: CPT | Performed by: NURSE PRACTITIONER

## 2022-12-05 PROCEDURE — 93005 ELECTROCARDIOGRAM TRACING: CPT

## 2022-12-05 PROCEDURE — 93010 ELECTROCARDIOGRAM REPORT: CPT | Mod: ,,, | Performed by: STUDENT IN AN ORGANIZED HEALTH CARE EDUCATION/TRAINING PROGRAM

## 2022-12-05 PROCEDURE — 85025 COMPLETE CBC W/AUTO DIFF WBC: CPT | Performed by: NURSE PRACTITIONER

## 2022-12-05 PROCEDURE — 0240U COVID/FLU A&B PCR: CPT | Performed by: NURSE PRACTITIONER

## 2022-12-05 NOTE — Clinical Note
"Sedrick Gomez" Lurdes was seen and treated in our emergency department on 12/5/2022.  He may return to work on 12/07/2022.       If you have any questions or concerns, please don't hesitate to call.      Aliya Chau MD"

## 2022-12-06 VITALS
SYSTOLIC BLOOD PRESSURE: 115 MMHG | OXYGEN SATURATION: 94 % | BODY MASS INDEX: 36.45 KG/M2 | DIASTOLIC BLOOD PRESSURE: 78 MMHG | RESPIRATION RATE: 18 BRPM | WEIGHT: 315 LBS | HEART RATE: 82 BPM | TEMPERATURE: 98 F | HEIGHT: 78 IN

## 2022-12-06 PROCEDURE — 25500020 PHARM REV CODE 255: Performed by: EMERGENCY MEDICINE

## 2022-12-06 PROCEDURE — 94640 AIRWAY INHALATION TREATMENT: CPT

## 2022-12-06 PROCEDURE — 25000003 PHARM REV CODE 250: Performed by: EMERGENCY MEDICINE

## 2022-12-06 PROCEDURE — 25000242 PHARM REV CODE 250 ALT 637 W/ HCPCS: Performed by: EMERGENCY MEDICINE

## 2022-12-06 PROCEDURE — 94761 N-INVAS EAR/PLS OXIMETRY MLT: CPT

## 2022-12-06 PROCEDURE — 63600175 PHARM REV CODE 636 W HCPCS: Performed by: EMERGENCY MEDICINE

## 2022-12-06 RX ORDER — KETOROLAC TROMETHAMINE 30 MG/ML
15 INJECTION, SOLUTION INTRAMUSCULAR; INTRAVENOUS
Status: COMPLETED | OUTPATIENT
Start: 2022-12-06 | End: 2022-12-06

## 2022-12-06 RX ORDER — KETOROLAC TROMETHAMINE 30 MG/ML
30 INJECTION, SOLUTION INTRAMUSCULAR; INTRAVENOUS
Status: DISCONTINUED | OUTPATIENT
Start: 2022-12-06 | End: 2022-12-06

## 2022-12-06 RX ORDER — SODIUM CHLORIDE 9 MG/ML
500 INJECTION, SOLUTION INTRAVENOUS
Status: COMPLETED | OUTPATIENT
Start: 2022-12-06 | End: 2022-12-06

## 2022-12-06 RX ORDER — IPRATROPIUM BROMIDE AND ALBUTEROL SULFATE 2.5; .5 MG/3ML; MG/3ML
3 SOLUTION RESPIRATORY (INHALATION)
Status: COMPLETED | OUTPATIENT
Start: 2022-12-06 | End: 2022-12-06

## 2022-12-06 RX ADMIN — SODIUM CHLORIDE 500 ML: 9 INJECTION, SOLUTION INTRAVENOUS at 12:12

## 2022-12-06 RX ADMIN — KETOROLAC TROMETHAMINE 15 MG: 30 INJECTION, SOLUTION INTRAMUSCULAR at 12:12

## 2022-12-06 RX ADMIN — IPRATROPIUM BROMIDE AND ALBUTEROL SULFATE 3 ML: 2.5; .5 SOLUTION RESPIRATORY (INHALATION) at 01:12

## 2022-12-06 RX ADMIN — IOPAMIDOL 100 ML: 755 INJECTION, SOLUTION INTRAVENOUS at 12:12

## 2022-12-06 NOTE — FIRST PROVIDER EVALUATION
"Medical screening examination initiated.  I have conducted a focused provider triage encounter, findings are as follows:    Brief history of present illness:  41 y/o male presents with being seen here on 12/1 and was dx with pneumonia. On doxycycline. States he feels much worse with worsening sob, chest pain, dizziness.     Vitals:    12/05/22 1921   BP: (!) 131/95   Patient Position: Sitting   Pulse: 85   Resp: (!) 22   Temp: 97.6 °F (36.4 °C)   TempSrc: Oral   SpO2: 95%   Weight: (!) 158.8 kg (350 lb)   Height: 6' 6" (1.981 m)       Pertinent physical exam:  alert, appears to not feel well, ambulatory    Brief workup plan:  ekg, labs, imaging    Preliminary workup initiated; this workup will be continued and followed by the physician or advanced practice provider that is assigned to the patient when roomed.  "

## 2022-12-06 NOTE — ED PROVIDER NOTES
"Encounter Date: 12/5/2022       History     Chief Complaint   Patient presents with    Shortness of Breath     C/O increased SOB starting last night. Pt states was seen in ED 12/1 for same CC and Dx with "pneumonia". Pt also c/o constant right sided chest pain       42-year-old male with history of hypertension, tobacco use, recently diagnosed with "pneumonia" on 12/1, compliant with doxycycline and albuterol inhaler, presenting for worsening shortness of breath and worsening chest discomfort.  He reports both a chest tightness making it difficult to breathe as well as a burning sensation on the right side worse with deep breaths.  He has never had a pain like this before.  This was not present when he initially came for evaluation on December 1st.  Does think symptoms may get worse after his albuterol inhaler but is not certain.  He is still coughing up clear sputum, no fever.  He is able to tolerate PO but reports a decreased appetite.  He has not taken anything for his pain.  No family history of early CAD, no history of VTE, no leg pain or swelling.     The history is provided by the patient.   Review of patient's allergies indicates:  No Known Allergies  Past Medical History:   Diagnosis Date    Essential (primary) hypertension      Past Surgical History:   Procedure Laterality Date    APPENDECTOMY      HERNIA REPAIR      TONSILLECTOMY       History reviewed. No pertinent family history.  Social History     Tobacco Use    Smoking status: Every Day     Types: Vaping with nicotine    Smokeless tobacco: Never   Substance Use Topics    Alcohol use: Not Currently    Drug use: Not Currently     Types: Methamphetamines     Review of Systems   Constitutional:  Positive for appetite change (decreased). Negative for fever and unexpected weight change.   HENT:  Negative for postnasal drip and rhinorrhea.    Respiratory:  Positive for cough, chest tightness and shortness of breath.    Cardiovascular:  Positive for chest " pain. Negative for leg swelling.   Gastrointestinal:  Negative for abdominal pain, constipation, diarrhea, nausea and vomiting.   Musculoskeletal:  Negative for back pain and myalgias.   Skin:  Negative for rash.   Neurological:  Negative for weakness, light-headedness, numbness and headaches.     Physical Exam     Initial Vitals [12/05/22 1921]   BP Pulse Resp Temp SpO2   (!) 131/95 85 (!) 22 97.6 °F (36.4 °C) 95 %      MAP       --         Physical Exam    Nursing note and vitals reviewed.  Constitutional: He appears well-developed and well-nourished. He is not diaphoretic. No distress.   HENT:   Head: Normocephalic and atraumatic.   Lips dry   Eyes: Conjunctivae and EOM are normal.   Neck: Neck supple.   Normal range of motion.  Cardiovascular:  Normal rate, regular rhythm and intact distal pulses.           Pulmonary/Chest: No respiratory distress. He has no wheezes. He has no rhonchi. He has no rales. He exhibits no tenderness.   Diminished breath sounds from the mid to lower lung zones bilaterally.  No obvious wheezing appreciated.  Breath sounds are equal.   Abdominal: Abdomen is soft. Bowel sounds are normal. He exhibits no distension. There is no abdominal tenderness.   Musculoskeletal:         General: No tenderness or edema. Normal range of motion.      Cervical back: Normal range of motion and neck supple.     Neurological: He is alert and oriented to person, place, and time. He has normal strength. GCS score is 15. GCS eye subscore is 4. GCS verbal subscore is 5. GCS motor subscore is 6.   Skin: Skin is warm and dry. Capillary refill takes less than 2 seconds. No rash noted. No pallor.   Psychiatric: He has a normal mood and affect.       ED Course   Procedures  Labs Reviewed   CBC WITH DIFFERENTIAL - Abnormal; Notable for the following components:       Result Value    MCHC 31.5 (*)     IG# 0.06 (*)     All other components within normal limits   COVID/FLU A&B PCR - Normal    Narrative:     The Xpert  Xpress SARS-CoV-2/FLU/RSV plus is a rapid, multiplexed real-time PCR test intended for the simultaneous qualitative detection and differentiation of SARS-CoV-2, Influenza A, Influenza B, and respiratory syncytial virus (RSV) viral RNA in either nasopharyngeal swab or nasal swab specimens.         MAGNESIUM - Normal   TROPONIN I - Normal   B-TYPE NATRIURETIC PEPTIDE - Normal   COMPREHENSIVE METABOLIC PANEL     EKG Readings: (Independently Interpreted)   Initial Reading: No STEMI. Rhythm: Normal Sinus Rhythm. Heart Rate: 88. Ectopy: No Ectopy. Conduction: Normal. ST Segments: Normal ST Segments. T Waves: Normal.   1918     Imaging Results              CTA Chest Non-Coronary (PE Studies) (Final result)  Result time 12/06/22 09:23:19      Final result by Dinah Koch MD (12/06/22 09:23:19)                   Impression:      1. No evidence of pulmonary embolus  The preliminary and final reports are concordant.      Electronically signed by: Dinah Koch  Date:    12/06/2022  Time:    09:23               Narrative:    EXAMINATION:  CTA CHEST NON CORONARY (PE STUDIES)    CLINICAL HISTORY:  Pulmonary embolism (PE) suspected, unknown D-dimer;    TECHNIQUE:  Helically acquired images with axial, sagittal and coronal reformations were obtained from the thoracic inlet to the lung bases followingthe IV administration of contrast.  CTA timed for evaluation of the pulmonary arteries.  MIP images were performed.    Automated tube current modulation, weight-based exposure dosing, and/or iterative reconstruction technique utilized to reach lowest reasonably achievable exposure rate.    DLP: 394 mGy*cm    COMPARISON:  CT abdomen pelvis 10/12/2021    FINDINGS:  BASE OF NECK: No significant abnormality.    AORTA: Left-sided aortic arch.  No aneurysm and no significant atherosclerosis    PULMONARY VASCULATURE: Pulmonary arteries enhance normally.  No evidence of pulmonary embolus.    HEART: Normal size. No  effusion.    FARRAH/MEDIASTINUM: No enlarged lymph nodes by size criteria.    AIRWAYS: Patent.    LUNGS/PLEURA: Left lower lobe pulmonary nodule is unchanged dating back to at least October 2021 documenting greater than 1 year imaging stability.  Per Fleischner criteria no imaging follow-up necessary.  Mild atelectasis.    UPPER ABDOMEN: Partially imaged diverticulosis without acute inflammatory change in the upper abdomen    THORACIC SOFT TISSUES: Unremarkable.    BONES: No acute fracture. No suspicious lytic or sclerotic lesions.                        Preliminary result by Dinah Koch MD (12/06/22 01:44:06)                   Narrative:    START OF REPORT:  Technique: CT Scan of the chest was performed with intravenous contrast with direct axial images as well as sagittal and coronal reconstruction images pulmonary embolus protocol.    Dosage Information: Automated Exposure Control was utilized 393.69 mGy.cm.    Comparison: None.    Clinical History: Shortness of Breath (C/O increased SOB starting last night. Pt states was seen in ED 12/1 for same CC and Dx with pneumonia. Pt also c/o constant right sided chest pain ).    Findings:  Soft Tissues: Unremarkable.  Lines and Tubes: None.  Neck: The visualized soft tissues of the neck appear unremarkable. The thyroid gland appear unremarkable.  Mediastinum: The mediastinal structures are within normal limits.  Heart: The heart appears unremarkable.  Aorta: Unremarkable appearing aorta.  Pulmonary Arteries: No filling defects are seen in the pulmonary arteries to suggest pulmonary embolus.  Lungs: There is moderate non specific dependent change at the lung bases. Otherwise clear lungs with no focal infiltrate or consolidation.  Pleura: No effusions or pneumothorax are identified.  Bony Structures: The visualized bony structures appear unremarkable.  Abdomen: Unremarkable.      Impression:  1. No filling defects are seen in the pulmonary arteries to suggest  pulmonary embolus.  2. Otherwise clear lungs with no focal infiltrate or consolidation.  3. Details as above.                          Preliminary result by Nicholas Haddad MD (12/06/22 01:44:06)                   Narrative:    START OF REPORT:  Technique: CT Scan of the chest was performed with intravenous contrast with direct axial images as well as sagittal and coronal reconstruction images pulmonary embolus protocol.    Dosage Information: Automated Exposure Control was utilized 393.69 mGy.cm.    Comparison: None.    Clinical History: Shortness of Breath (C/O increased SOB starting last night. Pt states was seen in ED 12/1 for same CC and Dx with pneumonia. Pt also c/o constant right sided chest pain ).    Findings:  Soft Tissues: Unremarkable.  Lines and Tubes: None.  Neck: The visualized soft tissues of the neck appear unremarkable. The thyroid gland appear unremarkable.  Mediastinum: The mediastinal structures are within normal limits.  Heart: The heart appears unremarkable.  Aorta: Unremarkable appearing aorta.  Pulmonary Arteries: No filling defects are seen in the pulmonary arteries to suggest pulmonary embolus.  Lungs: There is moderate non specific dependent change at the lung bases. Otherwise clear lungs with no focal infiltrate or consolidation.  Pleura: No effusions or pneumothorax are identified.  Bony Structures: The visualized bony structures appear unremarkable.  Abdomen: Unremarkable.      Impression:  1. No filling defects are seen in the pulmonary arteries to suggest pulmonary embolus.  2. Otherwise clear lungs with no focal infiltrate or consolidation.  3. Details as above.                                         X-Ray Chest PA And Lateral (Final result)  Result time 12/05/22 20:19:20      Final result by Mahin Caicedo MD (12/05/22 20:19:20)                   Impression:      No acute cardiopulmonary process identified.      Electronically signed by: Mahin  "Caicedo  Date:    12/05/2022  Time:    20:19               Narrative:    EXAMINATION:  XR CHEST PA AND LATERAL    CLINICAL HISTORY:  sob;    TECHNIQUE:  Two views    COMPARISON:  December 1, 2022.    FINDINGS:  Cardiopericardial silhouette is within normal limits. Lungs are without dense focal or segmental consolidation, congestion, pleural effusion or pneumothorax.                                    X-Rays:   Independently Interpreted Readings:   Chest X-Ray: No infiltrates.  No acute abnormalities.   Medications   albuterol-ipratropium 2.5 mg-0.5 mg/3 mL nebulizer solution 3 mL (3 mLs Nebulization Given 12/6/22 0109)   ketorolac injection 15 mg (15 mg Intravenous Given 12/6/22 0030)   0.9%  NaCl infusion (500 mLs Intravenous New Bag 12/6/22 0030)   iopamidoL (ISOVUE-370) injection 100 mL (100 mLs Intravenous Given 12/6/22 0045)     Medical Decision Making:   History:   Old Records Summarized: other records.  Initial Assessment:   42-year-old male recently diagnosed with "pneumonia", compliant with his antibiotics and albuterol, presenting for worsening shortness of breath and chest discomfort.  No fevers and he is able to tolerate PO.  Screening cardiac workup initiated at triage is unremarkable.  Chest x-ray on my review does not show an obvious infiltrate.  Symptoms most likely secondary to a bronchitis.  He is really not certain if his symptoms worsen after his albuterol.  I will give him a treatment here to further evaluate.  Since he is having worsening chest discomfort that is pleuritic in nature, I will also obtain CTA of the chest to ensure this is not PE.  He has not taken anything for the pain, therefore I will give him Toradol along with IV fluids.  Reassess  Differential Diagnosis:   Pneumonia, bronchitis, dyspepsia, pleurisy, less likely PE and others  Independently Interpreted Test(s):   I have ordered and independently interpreted X-rays - see prior notes.  I have ordered and independently " interpreted EKG Reading(s) - see prior notes  Clinical Tests:   Lab Tests: Ordered and Reviewed  Radiological Study: Ordered and Reviewed  Medical Tests: Ordered and Reviewed           ED Course as of 12/06/22 1108   Tue Dec 06, 2022   0250 Reexamine.  Patient is resting comfortably, easily arousable.  He feels much improved after a DuoNeb.  CTA negative for PE or other acute infectious process.  No other lung pathology is reported.  I did discuss the option of discharge with a nebulizer, however he is not certain he can afford this.  We will attempt a spacer to see if this delivers the albuterol better.  He can continue his doxycycline until completion.  I have discussed this with him along with return precautions and the need for PCP follow-up.  Referral to [facility] has been given.  He is discharged in stable condition.  Smoking cessation discussed [RB]      ED Course User Index  [RB] Aliya Chau MD                 Clinical Impression:   Final diagnoses:  [R07.9] Chest pain  [J20.9] Acute bronchitis, unspecified organism (Primary)        ED Disposition Condition    Discharge Stable          ED Prescriptions    None       Follow-up Information       Follow up With Specialties Details Why Contact Info    [facility] - Emergency Dept Emergency Medicine  As needed, If symptoms worsen [address] [facility] [id number] [phone]    [facility]  Schedule an appointment as soon as possible for a visit in 3 days reevaluation [address] [place], [place]   [phone]             Aliya Chau MD  12/06/22 1108       Aliya Chau MD  12/06/22 110

## 2023-03-06 ENCOUNTER — HOSPITAL ENCOUNTER (EMERGENCY)
Facility: HOSPITAL | Age: 43
Discharge: LEFT BEFORE DISCHARGE | End: 2023-03-06
Payer: MEDICARE

## 2023-03-06 VITALS
HEIGHT: 78 IN | RESPIRATION RATE: 22 BRPM | BODY MASS INDEX: 36.45 KG/M2 | WEIGHT: 315 LBS | DIASTOLIC BLOOD PRESSURE: 95 MMHG | HEART RATE: 91 BPM | TEMPERATURE: 98 F | SYSTOLIC BLOOD PRESSURE: 149 MMHG | OXYGEN SATURATION: 93 %

## 2023-03-06 DIAGNOSIS — R06.02 SOB (SHORTNESS OF BREATH): ICD-10-CM

## 2023-03-06 LAB
ALBUMIN SERPL-MCNC: 3.8 G/DL (ref 3.5–5)
ALBUMIN/GLOB SERPL: 1.2 RATIO (ref 1.1–2)
ALP SERPL-CCNC: 73 UNIT/L (ref 40–150)
ALT SERPL-CCNC: 14 UNIT/L (ref 0–55)
AST SERPL-CCNC: 19 UNIT/L (ref 5–34)
BASOPHILS # BLD AUTO: 0.04 X10(3)/MCL (ref 0–0.2)
BASOPHILS NFR BLD AUTO: 0.4 %
BILIRUBIN DIRECT+TOT PNL SERPL-MCNC: 0.5 MG/DL
BNP BLD-MCNC: 25 PG/ML
BUN SERPL-MCNC: 12.9 MG/DL (ref 8.9–20.6)
CALCIUM SERPL-MCNC: 9.6 MG/DL (ref 8.4–10.2)
CHLORIDE SERPL-SCNC: 110 MMOL/L (ref 98–107)
CO2 SERPL-SCNC: 25 MMOL/L (ref 22–29)
CREAT SERPL-MCNC: 0.78 MG/DL (ref 0.73–1.18)
EOSINOPHIL # BLD AUTO: 0.32 X10(3)/MCL (ref 0–0.9)
EOSINOPHIL NFR BLD AUTO: 3.1 %
ERYTHROCYTE [DISTWIDTH] IN BLOOD BY AUTOMATED COUNT: 13.7 % (ref 11.5–17)
FLUAV AG UPPER RESP QL IA.RAPID: NOT DETECTED
FLUBV AG UPPER RESP QL IA.RAPID: NOT DETECTED
GFR SERPLBLD CREATININE-BSD FMLA CKD-EPI: >60 MLS/MIN/1.73/M2
GLOBULIN SER-MCNC: 3.1 GM/DL (ref 2.4–3.5)
GLUCOSE SERPL-MCNC: 92 MG/DL (ref 74–100)
HCT VFR BLD AUTO: 49.8 % (ref 42–52)
HGB BLD-MCNC: 15.8 G/DL (ref 14–18)
IMM GRANULOCYTES # BLD AUTO: 0.03 X10(3)/MCL (ref 0–0.04)
IMM GRANULOCYTES NFR BLD AUTO: 0.3 %
LYMPHOCYTES # BLD AUTO: 2.12 X10(3)/MCL (ref 0.6–4.6)
LYMPHOCYTES NFR BLD AUTO: 20.4 %
MCH RBC QN AUTO: 28.3 PG
MCHC RBC AUTO-ENTMCNC: 31.7 G/DL (ref 33–36)
MCV RBC AUTO: 89.2 FL (ref 80–94)
MONOCYTES # BLD AUTO: 1.07 X10(3)/MCL (ref 0.1–1.3)
MONOCYTES NFR BLD AUTO: 10.3 %
NEUTROPHILS # BLD AUTO: 6.82 X10(3)/MCL (ref 2.1–9.2)
NEUTROPHILS NFR BLD AUTO: 65.5 %
NRBC BLD AUTO-RTO: 0 %
PLATELET # BLD AUTO: 264 X10(3)/MCL (ref 130–400)
PMV BLD AUTO: 10.3 FL (ref 7.4–10.4)
POTASSIUM SERPL-SCNC: 4.5 MMOL/L (ref 3.5–5.1)
PROT SERPL-MCNC: 6.9 GM/DL (ref 6.4–8.3)
RBC # BLD AUTO: 5.58 X10(6)/MCL (ref 4.7–6.1)
SARS-COV-2 RNA RESP QL NAA+PROBE: NOT DETECTED
SODIUM SERPL-SCNC: 143 MMOL/L (ref 136–145)
TROPONIN I SERPL-MCNC: <0.01 NG/ML (ref 0–0.04)
WBC # SPEC AUTO: 10.4 X10(3)/MCL (ref 4.5–11.5)

## 2023-03-06 PROCEDURE — 80053 COMPREHEN METABOLIC PANEL: CPT | Performed by: NURSE PRACTITIONER

## 2023-03-06 PROCEDURE — 85025 COMPLETE CBC W/AUTO DIFF WBC: CPT | Performed by: NURSE PRACTITIONER

## 2023-03-06 PROCEDURE — 83880 ASSAY OF NATRIURETIC PEPTIDE: CPT | Performed by: NURSE PRACTITIONER

## 2023-03-06 PROCEDURE — 93010 EKG 12-LEAD: ICD-10-PCS | Mod: ,,, | Performed by: STUDENT IN AN ORGANIZED HEALTH CARE EDUCATION/TRAINING PROGRAM

## 2023-03-06 PROCEDURE — 84484 ASSAY OF TROPONIN QUANT: CPT | Performed by: NURSE PRACTITIONER

## 2023-03-06 PROCEDURE — 0240U COVID/FLU A&B PCR: CPT | Performed by: NURSE PRACTITIONER

## 2023-03-06 PROCEDURE — 93010 ELECTROCARDIOGRAM REPORT: CPT | Mod: ,,, | Performed by: STUDENT IN AN ORGANIZED HEALTH CARE EDUCATION/TRAINING PROGRAM

## 2023-03-06 PROCEDURE — 99285 EMERGENCY DEPT VISIT HI MDM: CPT | Mod: 25

## 2023-03-06 PROCEDURE — 93005 ELECTROCARDIOGRAM TRACING: CPT

## 2023-03-06 NOTE — FIRST PROVIDER EVALUATION
"Medical screening examination initiated.  I have conducted a focused provider triage encounter, findings are as follows:    Brief history of present illness:  41 y/o male presents with right lung pain and feeling sob at times, sleepingmore today. No definitive cough. States had covid19 on 2/14 and hasn't really felt back to normal yet.     Vitals:    03/06/23 1620   BP: (!) 149/95   BP Location: Left arm   Patient Position: Sitting   Pulse: 91   Resp: (!) 22   Temp: 98 °F (36.7 °C)   TempSrc: Oral   SpO2: (!) 93%   Weight: (!) 163.3 kg (360 lb)   Height: 6' 6" (1.981 m)       Pertinent physical exam:  alert, appears to not feel well, ambulatory    Brief workup plan:  ekg, labs, imaging    Preliminary workup initiated; this workup will be continued and followed by the physician or advanced practice provider that is assigned to the patient when roomed.  "

## 2023-03-07 ENCOUNTER — HOSPITAL ENCOUNTER (EMERGENCY)
Facility: HOSPITAL | Age: 43
Discharge: HOME | End: 2023-03-08
Attending: EMERGENCY MEDICINE
Payer: MEDICARE

## 2023-03-07 VITALS
HEART RATE: 87 BPM | SYSTOLIC BLOOD PRESSURE: 144 MMHG | HEIGHT: 78 IN | DIASTOLIC BLOOD PRESSURE: 97 MMHG | TEMPERATURE: 99 F | OXYGEN SATURATION: 94 % | RESPIRATION RATE: 20 BRPM | BODY MASS INDEX: 36.45 KG/M2 | WEIGHT: 315 LBS

## 2023-03-07 DIAGNOSIS — I10 SEVERE HYPERTENSION: Primary | ICD-10-CM

## 2023-03-07 DIAGNOSIS — R07.9 CHEST PAIN: ICD-10-CM

## 2023-03-07 DIAGNOSIS — R07.9 NONSPECIFIC CHEST PAIN: ICD-10-CM

## 2023-03-07 LAB
ALBUMIN SERPL-MCNC: 3.8 G/DL (ref 3.5–5)
ALBUMIN/GLOB SERPL: 1.1 RATIO (ref 1.1–2)
ALP SERPL-CCNC: 74 UNIT/L (ref 40–150)
ALT SERPL-CCNC: 13 UNIT/L (ref 0–55)
AST SERPL-CCNC: 17 UNIT/L (ref 5–34)
BASOPHILS # BLD AUTO: 0.04 X10(3)/MCL (ref 0–0.2)
BASOPHILS NFR BLD AUTO: 0.4 %
BILIRUBIN DIRECT+TOT PNL SERPL-MCNC: 0.3 MG/DL
BNP BLD-MCNC: <10 PG/ML
BUN SERPL-MCNC: 12.6 MG/DL (ref 8.9–20.6)
CALCIUM SERPL-MCNC: 9.5 MG/DL (ref 8.4–10.2)
CHLORIDE SERPL-SCNC: 105 MMOL/L (ref 98–107)
CO2 SERPL-SCNC: 27 MMOL/L (ref 22–29)
CREAT SERPL-MCNC: 0.89 MG/DL (ref 0.73–1.18)
D DIMER PPP IA.FEU-MCNC: <=0.27 UG/ML FEU (ref 0–0.5)
EOSINOPHIL # BLD AUTO: 0.28 X10(3)/MCL (ref 0–0.9)
EOSINOPHIL NFR BLD AUTO: 2.5 %
ERYTHROCYTE [DISTWIDTH] IN BLOOD BY AUTOMATED COUNT: 13.6 % (ref 11.5–17)
FLUAV AG UPPER RESP QL IA.RAPID: NOT DETECTED
FLUBV AG UPPER RESP QL IA.RAPID: NOT DETECTED
GFR SERPLBLD CREATININE-BSD FMLA CKD-EPI: >60 MLS/MIN/1.73/M2
GLOBULIN SER-MCNC: 3.4 GM/DL (ref 2.4–3.5)
GLUCOSE SERPL-MCNC: 110 MG/DL (ref 74–100)
HCT VFR BLD AUTO: 50.1 % (ref 42–52)
HGB BLD-MCNC: 15.7 G/DL (ref 14–18)
IMM GRANULOCYTES # BLD AUTO: 0.05 X10(3)/MCL (ref 0–0.04)
IMM GRANULOCYTES NFR BLD AUTO: 0.4 %
LIPASE SERPL-CCNC: 17 U/L
LYMPHOCYTES # BLD AUTO: 2.65 X10(3)/MCL (ref 0.6–4.6)
LYMPHOCYTES NFR BLD AUTO: 23.6 %
MCH RBC QN AUTO: 28.3 PG
MCHC RBC AUTO-ENTMCNC: 31.3 G/DL (ref 33–36)
MCV RBC AUTO: 90.4 FL (ref 80–94)
MONOCYTES # BLD AUTO: 1.14 X10(3)/MCL (ref 0.1–1.3)
MONOCYTES NFR BLD AUTO: 10.2 %
NEUTROPHILS # BLD AUTO: 7.06 X10(3)/MCL (ref 2.1–9.2)
NEUTROPHILS NFR BLD AUTO: 62.9 %
NRBC BLD AUTO-RTO: 0 %
PLATELET # BLD AUTO: 264 X10(3)/MCL (ref 130–400)
PMV BLD AUTO: 9.8 FL (ref 7.4–10.4)
POTASSIUM SERPL-SCNC: 4.1 MMOL/L (ref 3.5–5.1)
PROT SERPL-MCNC: 7.2 GM/DL (ref 6.4–8.3)
RBC # BLD AUTO: 5.54 X10(6)/MCL (ref 4.7–6.1)
RSV A 5' UTR RNA NPH QL NAA+PROBE: NOT DETECTED
SARS-COV-2 RNA RESP QL NAA+PROBE: NOT DETECTED
SODIUM SERPL-SCNC: 140 MMOL/L (ref 136–145)
STREP A PCR (OHS): NOT DETECTED
TROPONIN I SERPL-MCNC: <0.01 NG/ML (ref 0–0.04)
WBC # SPEC AUTO: 11.2 X10(3)/MCL (ref 4.5–11.5)

## 2023-03-07 PROCEDURE — 0241U COVID/RSV/FLU A&B PCR: CPT | Performed by: EMERGENCY MEDICINE

## 2023-03-07 PROCEDURE — 93010 ELECTROCARDIOGRAM REPORT: CPT | Mod: ,,, | Performed by: INTERNAL MEDICINE

## 2023-03-07 PROCEDURE — 93005 ELECTROCARDIOGRAM TRACING: CPT

## 2023-03-07 PROCEDURE — 83880 ASSAY OF NATRIURETIC PEPTIDE: CPT | Performed by: EMERGENCY MEDICINE

## 2023-03-07 PROCEDURE — 84484 ASSAY OF TROPONIN QUANT: CPT | Performed by: EMERGENCY MEDICINE

## 2023-03-07 PROCEDURE — 83690 ASSAY OF LIPASE: CPT | Performed by: EMERGENCY MEDICINE

## 2023-03-07 PROCEDURE — 99285 EMERGENCY DEPT VISIT HI MDM: CPT | Mod: 25

## 2023-03-07 PROCEDURE — 87651 STREP A DNA AMP PROBE: CPT | Performed by: EMERGENCY MEDICINE

## 2023-03-07 PROCEDURE — 93010 EKG 12-LEAD: ICD-10-PCS | Mod: ,,, | Performed by: INTERNAL MEDICINE

## 2023-03-07 PROCEDURE — 25000003 PHARM REV CODE 250: Performed by: EMERGENCY MEDICINE

## 2023-03-07 PROCEDURE — 80053 COMPREHEN METABOLIC PANEL: CPT | Performed by: EMERGENCY MEDICINE

## 2023-03-07 PROCEDURE — 85025 COMPLETE CBC W/AUTO DIFF WBC: CPT | Performed by: EMERGENCY MEDICINE

## 2023-03-07 PROCEDURE — 63600175 PHARM REV CODE 636 W HCPCS: Performed by: EMERGENCY MEDICINE

## 2023-03-07 PROCEDURE — 96374 THER/PROPH/DIAG INJ IV PUSH: CPT

## 2023-03-07 PROCEDURE — 85379 FIBRIN DEGRADATION QUANT: CPT | Performed by: EMERGENCY MEDICINE

## 2023-03-07 RX ORDER — AMLODIPINE BESYLATE 10 MG/1
10 TABLET ORAL DAILY
Qty: 30 TABLET | Refills: 2 | Status: SHIPPED | OUTPATIENT
Start: 2023-03-07 | End: 2023-07-10 | Stop reason: SDUPTHER

## 2023-03-07 RX ORDER — AMLODIPINE BESYLATE 5 MG/1
10 TABLET ORAL
Status: COMPLETED | OUTPATIENT
Start: 2023-03-07 | End: 2023-03-07

## 2023-03-07 RX ORDER — IBUPROFEN 800 MG/1
800 TABLET ORAL EVERY 8 HOURS PRN
Qty: 20 TABLET | Refills: 0 | OUTPATIENT
Start: 2023-03-07 | End: 2023-08-22

## 2023-03-07 RX ORDER — LISINOPRIL 10 MG/1
40 TABLET ORAL
Status: COMPLETED | OUTPATIENT
Start: 2023-03-07 | End: 2023-03-07

## 2023-03-07 RX ORDER — KETOROLAC TROMETHAMINE 30 MG/ML
30 INJECTION, SOLUTION INTRAMUSCULAR; INTRAVENOUS
Status: COMPLETED | OUTPATIENT
Start: 2023-03-07 | End: 2023-03-07

## 2023-03-07 RX ORDER — LISINOPRIL 40 MG/1
40 TABLET ORAL DAILY
Qty: 30 TABLET | Refills: 2 | Status: SHIPPED | OUTPATIENT
Start: 2023-03-07 | End: 2023-07-10 | Stop reason: SDUPTHER

## 2023-03-07 RX ADMIN — AMLODIPINE BESYLATE 10 MG: 5 TABLET ORAL at 10:03

## 2023-03-07 RX ADMIN — KETOROLAC TROMETHAMINE 30 MG: 30 INJECTION, SOLUTION INTRAMUSCULAR; INTRAVENOUS at 11:03

## 2023-03-07 RX ADMIN — LISINOPRIL 40 MG: 10 TABLET ORAL at 10:03

## 2023-03-07 NOTE — Clinical Note
"Sedrick Gomez" Lurdes was seen and treated in our emergency department on 3/7/2023.  He may return to work on 03/10/2023.       If you have any questions or concerns, please don't hesitate to call.      Nikki Nuñez MD"

## 2023-03-08 NOTE — ED PROVIDER NOTES
Encounter Date: 3/7/2023       History     Chief Complaint   Patient presents with    Chest Pain     CHEST PAIN AND PALPITATION X2 DAYS ; REPORTS STABBING SENSATION;      42-year-old male complains of right-sided stabbing chest pain which has been intermittent since yesterday with associated palpitations.  He also has right-sided ear pain and right-sided sore throat.  He says he just has not been feeling well, body aches in general, staying in bed all day today. He also has a headache and just hurts all over.  He has a history of hypertension, off all of his medications for the last 2-3 months.  He states he has a history of polysubstance abuse but has been clean for the last 6 months.  No fever, abdominal pain, nausea vomiting.  No known sick contacts.  He was seen in the emergency room yesterday at [facility] for the same complaints and had a chest x-ray which was negative, and blood work which was negative but he left before he got his results.      Review of patient's allergies indicates:  No Known Allergies  Past Medical History:   Diagnosis Date    Essential (primary) hypertension      Past Surgical History:   Procedure Laterality Date    APPENDECTOMY      HERNIA REPAIR      TONSILLECTOMY       History reviewed. No pertinent family history.  Social History     Tobacco Use    Smoking status: Every Day     Types: Vaping with nicotine    Smokeless tobacco: Never   Substance Use Topics    Alcohol use: Not Currently    Drug use: Not Currently     Types: Methamphetamines     Review of Systems   Constitutional:  Positive for appetite change and fatigue.   HENT:  Positive for ear pain and sore throat.    Cardiovascular:  Positive for chest pain and palpitations.   Neurological:  Positive for headaches.   All other systems reviewed and are negative.    Physical Exam     Initial Vitals [03/07/23 2101]   BP Pulse Resp Temp SpO2   (!) 174/115 98 18 98.5 °F (36.9 °C) 97 %      MAP       --          Physical Exam    Nursing note and vitals reviewed.  Constitutional: Vital signs are normal. He appears well-developed and well-nourished.   HENT:   Head: Normocephalic and atraumatic.   Mouth/Throat: Oropharynx is clear and moist.   Tms are normal   Eyes: EOM are normal. Pupils are equal, round, and reactive to light.   Neck: Neck supple. No JVD present.   Cardiovascular:  Normal rate, regular rhythm and normal heart sounds.           Pulmonary/Chest: Breath sounds normal. No respiratory distress.   Musculoskeletal:         General: No tenderness or edema.      Cervical back: Neck supple. No edema or erythema.     Lymphadenopathy:     He has no cervical adenopathy.   Neurological: He is alert and oriented to person, place, and time. GCS score is 15. GCS eye subscore is 4. GCS verbal subscore is 5. GCS motor subscore is 6.   Skin: Skin is warm and dry. Capillary refill takes less than 2 seconds.       ED Course   Procedures  Labs Reviewed   CBC WITH DIFFERENTIAL - Abnormal; Notable for the following components:       Result Value    MCHC 31.3 (*)     IG# 0.05 (*)     All other components within normal limits   COMPREHENSIVE METABOLIC PANEL - Abnormal; Notable for the following components:    Glucose Level 110 (*)     All other components within normal limits   TROPONIN I - Normal   B-TYPE NATRIURETIC PEPTIDE - Normal   D DIMER, QUANTITATIVE - Normal   LIPASE - Normal   COVID/RSV/FLU A&B PCR - Normal    Narrative:     The Xpert Xpress SARS-CoV-2/FLU/RSV plus is a rapid, multiplexed real-time PCR test intended for the simultaneous qualitative detection and differentiation of SARS-CoV-2, Influenza A, Influenza B, and respiratory syncytial virus (RSV) viral RNA in either nasopharyngeal swab or nasal swab specimens.         STREP GROUP A BY PCR - Normal    Narrative:     The Xpert Xpress Strep A test is a rapid, qualitative in vitro diagnostic test for the detection of Streptococcus pyogenes (Group A ß-hemolytic  Streptococcus, Strep A) in throat swab specimens from patients with signs and symptoms of pharyngitis.       EKG Readings: (Independently Interpreted)   Initial Reading: No STEMI. Rhythm: Normal Sinus Rhythm. Heart Rate: 96. Ectopy: No Ectopy. ST Segments: Normal ST Segments. T Waves: Normal. Clinical Impression: Normal Sinus Rhythm     Imaging Results              X-Ray Chest 1 View (Final result)  Result time 03/07/23 22:03:07      Final result by Mahin Caicedo MD (03/07/23 22:03:07)                   Impression:      No definite acute cardiopulmonary process identified.      Electronically signed by: Mahin Caicedo  Date:    03/07/2023  Time:    22:03               Narrative:    EXAMINATION:  XR CHEST 1 VIEW    CLINICAL HISTORY:  chest pain;    TECHNIQUE:  One view    COMPARISON:  March 6, 2023.    FINDINGS:  Cardiopericardial silhouette is within normal limits.  Bilateral lungs dependent hypoventilatory changes without convincing acute dense focal or segmental consolidation, congestive process, pleural effusions or pneumothorax.                                       Medications   amLODIPine tablet 10 mg (10 mg Oral Given 3/7/23 2218)   lisinopriL tablet 40 mg (40 mg Oral Given 3/7/23 2218)   ketorolac injection 30 mg (30 mg Intravenous Given 3/7/23 3924)     Medical Decision Making:   Initial Assessment:   42-year-old male complains of right-sided stabbing chest pain which has been intermittent since yesterday with associated palpitations.  He also has right-sided ear pain and right-sided sore throat.  He says he just has not been feeling well, body aches in general, staying in bed all day today.  He has a history of hypertension, off all of his medications for the last 2-3 months.  He states he has a history of polysubstance abuse but has been clean for the last 6 months.  No fever, abdominal pain, nausea vomiting.  No known sick contacts.  He was seen in the emergency room yesterday at [facility] for the same complaints and had a chest x-ray which was negative, and blood work which was negative but he left before he got his results.    Differential Diagnosis:   Differential diagnosis includes but is not limited to viral upper respiratory infection, pneumonia, ACS, pulmonary embolism  Clinical Tests:   Lab Tests: Reviewed  Radiological Study: Reviewed  ED Management:  Patient was seen and evaluated in the emergency room with history, physical exam, lab work, COVID, flu, strep, chest x-ray, and EKG.  His workup in the emergency room is benign.  Overall, suspect that he has a viral upper respiratory infection and chest wall pain but I do recommend that he follow-up with a cardiologist for routine stress testing.  He also has been off of his blood pressure medicine for the last 2-3 months so I strongly recommend that he get back on that.  We gave him his 1st dose here in the emergency room have sent a prescription to the pharmacy for his blood pressure medication.  Also recommend he follow-up with the PCP and he is verbalized his understanding.  He is stable for discharge home in his currently not having chest pain. Discharge /97                        Clinical Impression:   Final diagnoses:  [R07.9] Chest pain  [I10] Severe hypertension (Primary)  [R07.9] Nonspecific chest pain        ED Disposition Condition    Discharge Stable          ED Prescriptions       Medication Sig Dispense Start Date End Date Auth. Provider    amLODIPine (NORVASC) 10 MG tablet Take 1 tablet (10 mg total) by mouth once daily. 30 tablet 3/7/2023 -- Nikki Nuñez MD    lisinopriL (PRINIVIL,ZESTRIL) 40 MG tablet Take 1 tablet (40 mg total) by mouth once daily. 30 tablet 3/7/2023 -- Nikki Nuñez MD    ibuprofen (ADVIL,MOTRIN) 800 MG tablet Take 1 tablet (800 mg total) by mouth every 8 (eight) hours as needed for Pain. 20 tablet 3/7/2023 -- Nikki Nuñez MD          Follow-up Information       Follow  up With Specialties Details Why Contact Info    PCP  Schedule an appointment as soon as possible for a visit       Jerry Quinteros MD Cardiology Schedule an appointment as soon as possible for a visit   [address] [place] [id number]  [phone]               Nikki Nuñez MD  03/08/23 0121

## 2023-03-09 ENCOUNTER — PATIENT OUTREACH (OUTPATIENT)
Dept: EMERGENCY MEDICINE | Facility: HOSPITAL | Age: 43
End: 2023-03-09
Payer: MEDICAID

## 2023-03-09 NOTE — PROGRESS NOTES
.Why Should I Have My Own Doctor or Nurse Practitioner (PCP) to Take Care of Me  What is a PCP (Primary Care Provider)?    A primary care provider is a doctor or nurse practitioner who you can call for an appointment and will see you when you are sick.    You will also be seen at scheduled appointment times during the year to check on your diabetes, or high blood pressure, or heart disease.    Why see the same PCP (doctor/nurse practitioner)?    You can be seen faster when you are sick           You, the PCP (doctor/nurse practitioner) and the office staff get to know each other; you begin to trust them to care for you. You take part in your health choices.   All of you together are a team.    Your medicine is looked at every time you visit, to be sure you are taking the medicine, as the PCP (doctor/nurse practitioner) ordered.    Your PCP (doctor/nurse practitioner) and their staff help keep you healthy and out of the hospital.  They can catch sicknesses earlier by ordering tests once a year to stop or prevent the sickness from getting worse.      Your PCP (doctor/nurse practitioner) can send you to providers who specialize (heart/bone/lung) if you need.  They and their office staff help keep track of your seeing other providers (doctors/nurse practitioners) and tests (CT/ MRIs/ X Rays)) taken.        PCPs want you to stay healthy.  Let us care for you.       .DASH Meal Plan  (Healthy eating plan)    Why use this meal plan?    This healthy meal plan lowers blood pressure.  This meal plan lowers the chance of you getting Diabetes, heart disease and stroke.  This meal plan also helps you lose weight.    DASH balanced meal plannin or more servings of fruits and vegetables every day.  At each meal, try to fill half of your plated with fruits and vegetables                    Eat 6-8 servings of whole grains every day.  Whole grains are:  Brown rice, oatmeal, whole wheat bread, whole grain, low salt cereals, [place]  bread, whole wheat flour tortillas                5 ounces of meat, chicken, or fish each day (3-ounce size of serving of meat is the same size as a deck of playing cards)  Fish like sardines, salmon and mackerel are also good for your heart.            2 servings of low-fat dairy each day (Milk, cottage cheese, yogurt)          Do Not:  Use More than 1,500 milligrams of salt a day if you have high blood pressure (2/3 teaspoon)    You would look at labels and total milligrams of salt per day        Do Not Add salt when cooking      Do Not Salt food before eating    Do Not Eat fried foods  Do Not Cook using butter, stick margarine, lard, shortening, coconut oil    Do Not Buy regular canned vegetables, pickles, or olives (too much salt; use low salt or no salt)  Do Not Do not buy premade or frozen meals    Do Not Eat fast food/buffet           Speak with your Doctor/Nurse practitioner about exercising 30 minutes a day          Modified from DASH eating plan education   .        What Do I Do If I Wake Up Sick                                                     If you wake up sick, or you start to fill sick during the day, try these tips to get care and start to feel better soon.                                                                                                                              As soon as you start to feel bad, call your doctor's office and ask for same day or next day visit appointment.        If you cannot be seen with your doctor's office within 24 hours, you can go to an urgent care and be seen.          How to stay well:     Take your medicine as ordered by your doctor      Fill your Medicine before you run out      Exercise       Enjoy walking in the sunlight daily  Keep your scheduled clinic appointments      Keep you scheduled yearly wellness visits

## 2023-04-17 ENCOUNTER — PATIENT OUTREACH (OUTPATIENT)
Dept: EMERGENCY MEDICINE | Facility: HOSPITAL | Age: 43
End: 2023-04-17
Payer: MEDICAID

## 2023-04-18 ENCOUNTER — LAB VISIT (OUTPATIENT)
Dept: LAB | Facility: HOSPITAL | Age: 43
End: 2023-04-18
Payer: MEDICARE

## 2023-04-18 ENCOUNTER — OFFICE VISIT (OUTPATIENT)
Dept: INTERNAL MEDICINE | Facility: CLINIC | Age: 43
End: 2023-04-18
Payer: MEDICARE

## 2023-04-18 VITALS
OXYGEN SATURATION: 100 % | WEIGHT: 315 LBS | RESPIRATION RATE: 20 BRPM | DIASTOLIC BLOOD PRESSURE: 81 MMHG | HEART RATE: 97 BPM | HEIGHT: 78 IN | SYSTOLIC BLOOD PRESSURE: 110 MMHG | TEMPERATURE: 98 F | BODY MASS INDEX: 36.45 KG/M2

## 2023-04-18 DIAGNOSIS — I10 HYPERTENSION, UNSPECIFIED TYPE: Primary | ICD-10-CM

## 2023-04-18 DIAGNOSIS — R79.89 ELEVATED TSH: ICD-10-CM

## 2023-04-18 DIAGNOSIS — R94.6 ABNORMAL RESULTS OF THYROID FUNCTION STUDIES: ICD-10-CM

## 2023-04-18 DIAGNOSIS — Z23 NEED FOR PNEUMOCOCCAL 20-VALENT CONJUGATE VACCINATION: ICD-10-CM

## 2023-04-18 DIAGNOSIS — E66.01 CLASS 3 SEVERE OBESITY WITH BODY MASS INDEX (BMI) OF 40.0 TO 44.9 IN ADULT, UNSPECIFIED OBESITY TYPE, UNSPECIFIED WHETHER SERIOUS COMORBIDITY PRESENT: ICD-10-CM

## 2023-04-18 DIAGNOSIS — Z72.0 TOBACCO USER: ICD-10-CM

## 2023-04-18 DIAGNOSIS — Z86.39 PERSONAL HISTORY OF OTHER ENDOCRINE, NUTRITIONAL AND METABOLIC DISEASE: ICD-10-CM

## 2023-04-18 LAB
CHOLEST SERPL-MCNC: 240 MG/DL
CHOLEST/HDLC SERPL: 5 {RATIO} (ref 0–5)
HDLC SERPL-MCNC: 49 MG/DL (ref 35–60)
LDLC SERPL CALC-MCNC: 159 MG/DL (ref 50–140)
T3FREE SERPL-MCNC: 2.82 PG/ML (ref 1.57–3.91)
T4 FREE SERPL-MCNC: 0.84 NG/DL (ref 0.7–1.48)
TRIGL SERPL-MCNC: 160 MG/DL (ref 34–140)
TSH SERPL-ACNC: 6.11 UIU/ML (ref 0.35–4.94)
VLDLC SERPL CALC-MCNC: 32 MG/DL

## 2023-04-18 PROCEDURE — 84481 FREE ASSAY (FT-3): CPT

## 2023-04-18 PROCEDURE — 84439 ASSAY OF FREE THYROXINE: CPT

## 2023-04-18 PROCEDURE — 80061 LIPID PANEL: CPT

## 2023-04-18 PROCEDURE — 90677 PCV20 VACCINE IM: CPT | Mod: PBBFAC

## 2023-04-18 PROCEDURE — 99214 OFFICE O/P EST MOD 30 MIN: CPT | Mod: PBBFAC

## 2023-04-18 PROCEDURE — 36415 COLL VENOUS BLD VENIPUNCTURE: CPT

## 2023-04-18 PROCEDURE — G0009 ADMIN PNEUMOCOCCAL VACCINE: HCPCS | Mod: PBBFAC

## 2023-04-18 PROCEDURE — 84443 ASSAY THYROID STIM HORMONE: CPT

## 2023-04-18 RX ADMIN — PNEUMOCOCCAL 20-VALENT CONJUGATE VACCINE 0.5 ML
2.2; 2.2; 2.2; 2.2; 2.2; 2.2; 2.2; 2.2; 2.2; 2.2; 2.2; 2.2; 2.2; 2.2; 2.2; 2.2; 4.4; 2.2; 2.2; 2.2 INJECTION, SUSPENSION INTRAMUSCULAR at 03:04

## 2023-04-18 NOTE — PROGRESS NOTES
[facility]    Subjective:        Sedrick Chatman is a 42 y.o. male who  has a past medical history of Essential (primary) hypertension.  He presents to clinic today for follow-up of chronic medical conditions.  Patient states that he is doing well does not have any complaints today.  Patient denies lightheadedness, dizziness, chest pain shortness a breath, abdominal pain, nausea, vomiting, hematuria hematochezia dysuria or peripheral edema.    Past medical history:  Hypertension and obesity   Past surgical history: Tonsillectomy, appendectomy and hernia repair   Family history grandmother had lung cancer and diabetes, grandfather had MI   Social history:  Patient reports tobacco use 2 packs per day since he was 13 years old currently now does 1 pack per day, 2 alcohol beverages a week, and history of IV drug use, meth, cocaine, and speed.  Patient is sexually active with multiple partners and does not use a condom but get tested regularly    Review of Systems:  10 point ROS negative except for HPI    Objective:   Vital Signs:  Vitals:    04/18/23 1420   BP: 110/81   Pulse: 97   Resp: 20   Temp: 97.8 °F (36.6 °C)            Body mass index is 44.94 kg/m².     General:  Well developed, well nourished, no acute respiratory distress  Head: Normocephalic, atraumatic  Eyes: PERRL, EOMI, anicteric sclera  Throat: No posterior pharyngeal erythema or exudate, no tonsillar exudate  Neck: supple, normal ROM, no thyromegaly   CVS:  RRR, S1 and S2 normal, no murmurs, no added heart sounds, rubs, gallops, 2+ peripheral pulses  Resp:  Lungs clear to auscultation bilaterally, no wheezes, rales, or rhonci  GI:  Abdomen soft, non-tender, non-distended, normoactive bowel sounds  MSK:  No muscle atrophy, cyanosis, peripheral edema, full range of motion  Skin:  No rashes, ulcers, erythema  Neuro:  Alert and oriented x3, No focal neuro deficits, CNII-XII grossly intact  Psych:  Appropriate mood and affect          Laboratory:  Lab Results   Component Value Date    WBC 11.2 03/07/2023    HGB 15.7 03/07/2023    HCT 50.1 03/07/2023     03/07/2023    MCV 90.4 03/07/2023    RDW 13.6 03/07/2023    Lab Results   Component Value Date     03/07/2023    K 4.1 03/07/2023    CO2 27 03/07/2023    BUN 12.6 03/07/2023    CREATININE 0.89 03/07/2023    CALCIUM 9.5 03/07/2023    MG 1.90 12/05/2022      Lab Results   Component Value Date    HGBA1C 5.5 08/07/2020    CREATININE 0.89 03/07/2023    Lab Results   Component Value Date    TSH 2.7531 09/24/2020    [id number] 0.82 09/24/2020                .labDM:   Lab Results   Component Value Date    HGBA1C 5.5 08/07/2020    CREATININE 0.89 03/07/2023     Thyroid:   Lab Results   Component Value Date    TSH 2.7531 09/24/2020    [id number] 0.82 09/24/2020     Anemia: No results found for: IRON, TIBC, FERRITIN, [id number], FOLATE    Current Medications:  Current Outpatient Medications   Medication Instructions    albuterol (VENTOLIN HFA) 90 mcg/actuation inhaler 2 puffs, Inhalation, Every 6 hours PRN, Rescue    amLODIPine (NORVASC) 10 mg, Oral, Daily    ibuprofen (ADVIL,MOTRIN) 800 mg, Oral, Every 8 hours PRN    lisinopriL (PRINIVIL,ZESTRIL) 40 mg, Oral, Daily        Assessment and Plan:      HTN  - on amlodipine 10 mg and lisiopril 40 mg    Elevated TSH  - tsh was elevated at 7.4 on 5/20/2022. Will repeat leat TSH T3 and T4    Tobacco user  - referred patient to smoking cessation program    Health Maintenance    Zoster- not indicated at this time    Tdap- need    Pneumovax- pneumococcal 23 Valent 09/08/2020, Prevnar 20 04/18/2023    Prostate cancer screening- not indicated at this time    Colon cancer Screening- not indicated at this time    Lung Cancer Screening- not indicated at this time      Health Maintenance   Topic Date Due    TETANUS VACCINE  Never done    Lipid Panel  08/07/2025    Hepatitis C Screening  Completed                  Evgeny Hale,  MD

## 2023-05-22 ENCOUNTER — PATIENT OUTREACH (OUTPATIENT)
Dept: EMERGENCY MEDICINE | Facility: HOSPITAL | Age: 43
End: 2023-05-22
Payer: MEDICAID

## 2023-06-08 ENCOUNTER — TELEPHONE (OUTPATIENT)
Dept: SMOKING CESSATION | Facility: CLINIC | Age: 43
End: 2023-06-08
Payer: MEDICAID

## 2023-06-08 NOTE — TELEPHONE ENCOUNTER
Pt had not shown up for his [facility] appt.  Called and spoke with pt.  Pt stated that he had forgotten about this appt.  Pt rescheduled to 6/12/23 at 2pm. Pt was also given address and directions.

## 2023-06-12 ENCOUNTER — TELEPHONE (OUTPATIENT)
Dept: SMOKING CESSATION | Facility: CLINIC | Age: 43
End: 2023-06-12
Payer: MEDICAID

## 2023-06-12 NOTE — TELEPHONE ENCOUNTER
Pt had not shown up for his [facility] appt. Called and spoke with pt.  Pt stated that needed to reschedule.  Pt rescheduled to Elidia 15, 2023 at 2 pm.

## 2023-06-15 ENCOUNTER — CLINICAL SUPPORT (OUTPATIENT)
Dept: SMOKING CESSATION | Facility: CLINIC | Age: 43
End: 2023-06-15

## 2023-06-15 DIAGNOSIS — F17.200 NICOTINE DEPENDENCE: Primary | ICD-10-CM

## 2023-06-15 PROCEDURE — 99404 PREV MED CNSL INDIV APPRX 60: CPT | Mod: S$GLB,,,

## 2023-06-15 PROCEDURE — 99404 PR PREVENT COUNSEL,INDIV,60 MIN: ICD-10-PCS | Mod: S$GLB,,,

## 2023-06-15 RX ORDER — BUPROPION HYDROCHLORIDE 150 MG/1
TABLET, EXTENDED RELEASE ORAL
Qty: 60 TABLET | Refills: 0 | Status: SHIPPED | OUTPATIENT
Start: 2023-06-15

## 2023-06-15 RX ORDER — IBUPROFEN 200 MG
1 TABLET ORAL DAILY
Qty: 28 PATCH | Refills: 0 | Status: SHIPPED | OUTPATIENT
Start: 2023-06-15

## 2023-06-15 NOTE — PROGRESS NOTES
Patient will be participating in tobacco cessation meetings and will begin the prescribed tobacco cessation medication regimen of 150 mg Wellbutrin SR BID and 21 mg nicotine patch QD. Patient denies any low moods or SI/HI. Pt currently smokes 20 cigarettes per day. Discussed the role of tobacco cessation program, role of nicotine replacement therapy (NRT), role of Wellbutrin and behavioral changes to assist the patient to reach his goal of being tobacco free. Pt started on rate reduction and wait time of 15 min prior to smoking. Pt's exhaled carbon monoxide level was 8 ppm as per [employer]oker[employer]. (non- smoker = 0-5 ppm.) Pt will work on making the inside of his vehicle tobacco free.  Pt resides in a sober living house.  Pt stated that all of the residents smoke or vape.

## 2023-07-10 ENCOUNTER — PATIENT OUTREACH (OUTPATIENT)
Dept: EMERGENCY MEDICINE | Facility: HOSPITAL | Age: 43
End: 2023-07-10
Payer: MEDICARE

## 2023-07-10 RX ORDER — LISINOPRIL 40 MG/1
40 TABLET ORAL DAILY
Qty: 30 TABLET | Refills: 4 | Status: SHIPPED | OUTPATIENT
Start: 2023-07-10 | End: 2023-07-14 | Stop reason: SDUPTHER

## 2023-07-10 RX ORDER — AMLODIPINE BESYLATE 10 MG/1
10 TABLET ORAL DAILY
Qty: 30 TABLET | Refills: 4 | Status: SHIPPED | OUTPATIENT
Start: 2023-07-10 | End: 2023-07-14 | Stop reason: SDUPTHER

## 2023-07-11 ENCOUNTER — PATIENT OUTREACH (OUTPATIENT)
Dept: EMERGENCY MEDICINE | Facility: HOSPITAL | Age: 43
End: 2023-07-11
Payer: MEDICARE

## 2023-07-14 DIAGNOSIS — I10 HYPERTENSION, UNSPECIFIED TYPE: Primary | ICD-10-CM

## 2023-07-14 RX ORDER — LISINOPRIL 40 MG/1
40 TABLET ORAL DAILY
Qty: 90 TABLET | Refills: 3 | Status: SHIPPED | OUTPATIENT
Start: 2023-07-14

## 2023-07-14 RX ORDER — AMLODIPINE BESYLATE 10 MG/1
10 TABLET ORAL DAILY
Qty: 90 TABLET | Refills: 3 | Status: SHIPPED | OUTPATIENT
Start: 2023-07-14

## 2023-07-27 ENCOUNTER — TELEPHONE (OUTPATIENT)
Dept: SMOKING CESSATION | Facility: CLINIC | Age: 43
End: 2023-07-27
Payer: MEDICARE

## 2023-07-27 NOTE — TELEPHONE ENCOUNTER
Pt had not shown up for his smoking cessation follow up appt.  Called and spoke with pt.  Pt stated that he was arriving at a psych appointment.  Pt will call back to reschedule.

## 2023-08-09 ENCOUNTER — TELEPHONE (OUTPATIENT)
Dept: SMOKING CESSATION | Facility: CLINIC | Age: 43
End: 2023-08-09
Payer: MEDICARE

## 2023-08-09 NOTE — TELEPHONE ENCOUNTER
Contacted pt regarding rescheduling his follow up appt.  Spoke with pt.  Pt stated that he was busy and would call back to reschedule.

## 2023-08-18 ENCOUNTER — PATIENT OUTREACH (OUTPATIENT)
Dept: EMERGENCY MEDICINE | Facility: HOSPITAL | Age: 43
End: 2023-08-18
Payer: MEDICARE

## 2023-08-22 ENCOUNTER — PATIENT OUTREACH (OUTPATIENT)
Dept: EMERGENCY MEDICINE | Facility: HOSPITAL | Age: 43
End: 2023-08-22
Payer: MEDICARE

## 2023-08-22 ENCOUNTER — HOSPITAL ENCOUNTER (EMERGENCY)
Facility: HOSPITAL | Age: 43
Discharge: HOME | End: 2023-08-22
Attending: EMERGENCY MEDICINE
Payer: MEDICARE

## 2023-08-22 VITALS
HEIGHT: 78 IN | WEIGHT: 315 LBS | HEART RATE: 98 BPM | TEMPERATURE: 99 F | SYSTOLIC BLOOD PRESSURE: 148 MMHG | DIASTOLIC BLOOD PRESSURE: 104 MMHG | BODY MASS INDEX: 36.45 KG/M2 | RESPIRATION RATE: 18 BRPM | OXYGEN SATURATION: 98 %

## 2023-08-22 DIAGNOSIS — L55.0 SUNBURN OF FIRST DEGREE: Primary | ICD-10-CM

## 2023-08-22 PROCEDURE — 96372 THER/PROPH/DIAG INJ SC/IM: CPT | Performed by: PHYSICIAN ASSISTANT

## 2023-08-22 PROCEDURE — 99284 EMERGENCY DEPT VISIT MOD MDM: CPT

## 2023-08-22 PROCEDURE — 63600175 PHARM REV CODE 636 W HCPCS: Performed by: PHYSICIAN ASSISTANT

## 2023-08-22 RX ORDER — SILVER SULFADIAZINE 10 G/1000G
CREAM TOPICAL 2 TIMES DAILY
Qty: 400 G | Refills: 0 | Status: SHIPPED | OUTPATIENT
Start: 2023-08-22 | End: 2023-08-22 | Stop reason: SDUPTHER

## 2023-08-22 RX ORDER — KETOROLAC TROMETHAMINE 10 MG/1
10 TABLET, FILM COATED ORAL EVERY 6 HOURS PRN
Qty: 20 TABLET | Refills: 0 | Status: SHIPPED | OUTPATIENT
Start: 2023-08-22 | End: 2023-08-22 | Stop reason: SDUPTHER

## 2023-08-22 RX ORDER — KETOROLAC TROMETHAMINE 10 MG/1
10 TABLET, FILM COATED ORAL EVERY 6 HOURS PRN
Qty: 20 TABLET | Refills: 0 | Status: SHIPPED | OUTPATIENT
Start: 2023-08-22 | End: 2023-08-27

## 2023-08-22 RX ORDER — KETOROLAC TROMETHAMINE 30 MG/ML
60 INJECTION, SOLUTION INTRAMUSCULAR; INTRAVENOUS
Status: COMPLETED | OUTPATIENT
Start: 2023-08-22 | End: 2023-08-22

## 2023-08-22 RX ORDER — SILVER SULFADIAZINE 10 G/1000G
CREAM TOPICAL 2 TIMES DAILY
Qty: 400 G | Refills: 0 | Status: SHIPPED | OUTPATIENT
Start: 2023-08-22 | End: 2023-08-29

## 2023-08-22 RX ADMIN — KETOROLAC TROMETHAMINE 60 MG: 30 INJECTION, SOLUTION INTRAMUSCULAR; INTRAVENOUS at 08:08

## 2023-08-23 ENCOUNTER — PATIENT OUTREACH (OUTPATIENT)
Dept: EMERGENCY MEDICINE | Facility: HOSPITAL | Age: 43
End: 2023-08-23
Payer: MEDICARE

## 2023-08-23 NOTE — ED PROVIDER NOTES
Encounter Date: 8/22/2023       History     Chief Complaint   Patient presents with    Rash     Sun burn to bilateral lower extremeties;      43-year-old male presents to ED for evaluation of sunburn to his bilateral legs.  Patient reports that he was out tubing 2 days ago when he sustained a sunburn.  Reports to pain and swelling.  Denies any itching.  Denies any blistering.  Denies any drainage.  Denies any fever.  States he has been drinking water and moisturizing.  Taking Tylenol without relief.    The history is provided by the patient. No  was used.     Review of patient's allergies indicates:  No Known Allergies  Past Medical History:   Diagnosis Date    Essential (primary) hypertension      Past Surgical History:   Procedure Laterality Date    APPENDECTOMY      HERNIA REPAIR      TONSILLECTOMY       No family history on file.  Social History     Tobacco Use    Smoking status: Every Day     Current packs/day: 1.00     Types: Cigarettes, Vaping with nicotine     Last attempt to quit: 5/1/2023    Smokeless tobacco: Former   Substance Use Topics    Alcohol use: Not Currently    Drug use: Not Currently     Types: Methamphetamines     Review of Systems   Constitutional:  Negative for chills and fever.   Respiratory:  Negative for cough and shortness of breath.    Cardiovascular:  Negative for chest pain.   Gastrointestinal:  Negative for nausea.   Genitourinary:  Negative for dysuria.   Musculoskeletal:  Negative for back pain.   Skin:  Positive for rash.   Neurological:  Negative for weakness.   Hematological:  Does not bruise/bleed easily.       Physical Exam     Initial Vitals [08/22/23 2022]   BP Pulse Resp Temp SpO2   (!) 148/104 98 18 98.5 °F (36.9 °C) 98 %      MAP       --         Physical Exam    Nursing note and vitals reviewed.  Constitutional: He appears well-developed. He is cooperative.   HENT:   Head: Normocephalic and atraumatic.   Right Ear: Tympanic membrane and external ear  normal.   Left Ear: Tympanic membrane and external ear normal.   Mouth/Throat: Uvula is midline, oropharynx is clear and moist and mucous membranes are normal. No trismus in the jaw. No uvula swelling.   Eyes: Conjunctivae are normal. Pupils are equal, round, and reactive to light.   Neck: Neck supple.   Normal range of motion.  Cardiovascular:  Normal rate, regular rhythm and normal heart sounds.           Pulmonary/Chest: Breath sounds normal. No respiratory distress. He has no wheezes. He has no rhonchi. He has no rales.   Abdominal: Abdomen is soft. Bowel sounds are normal. There is no abdominal tenderness. There is no rebound and no guarding.   Musculoskeletal:         General: Normal range of motion.      Cervical back: Normal range of motion and neck supple.     Neurological: He is alert and oriented to person, place, and time.   Skin: Skin is warm and dry. Capillary refill takes less than 2 seconds.   First-degree sunburn noted to anterior bilateral legs.   Psychiatric: He has a normal mood and affect.         ED Course   Procedures  Labs Reviewed - No data to display       Imaging Results    None          Medications   ketorolac injection 60 mg (60 mg Intramuscular Given 8/22/23 2053)     Medical Decision Making  43-year-old male presents to ED for evaluation of sunburn to his bilateral legs.  Patient reports that he was out tubing 2 days ago when he sustained a sunburn.  Reports to pain and swelling.  Denies any itching.  Denies any blistering.  Denies any drainage.  Denies any fever.  States he has been drinking water and moisturizing.  Taking Tylenol without relief.    Amount and/or Complexity of Data Reviewed  Discussion of management or test interpretation with external provider(s): Discussed using anti-inflammatory pain medicine.  Reviewed keeping moisturize and importance of hydration.  Will give Silvadene.    Risk  Prescription drug management.                               Clinical Impression:    Final diagnoses:  [L55.0] Sunburn of first degree (Primary)        ED Disposition Condition    Discharge Stable          ED Prescriptions       Medication Sig Dispense Start Date End Date Auth. Provider    ketorolac (TORADOL) 10 mg tablet Take 1 tablet (10 mg total) by mouth every 6 (six) hours as needed for Pain. 20 tablet 8/22/2023 8/27/2023 Jessica Vargas PA    silver sulfADIAZINE 1% (SILVADENE) 1 % cream Apply topically 2 (two) times daily. for 7 days 400 g 8/22/2023 8/29/2023 Jessica Vargas PA          Follow-up Information       Follow up With Specialties Details Why Contact Info    PCP  In 1 week As needed, If you do not have a PCP you may call [phone] to help get set up If you do not have a PCP you may call [phone] to help get set up.             Jessica Vargas PA  08/22/23 2054

## 2023-08-23 NOTE — DISCHARGE INSTRUCTIONS
Keep moisturize.  Drink plenty of water.  Use Toradol for pain and swelling.  May use Tylenol for pain.  Apply Silvadene to area.

## 2023-09-06 ENCOUNTER — CLINICAL SUPPORT (OUTPATIENT)
Dept: SMOKING CESSATION | Facility: CLINIC | Age: 43
End: 2023-09-06

## 2023-09-06 DIAGNOSIS — F17.200 NICOTINE DEPENDENCE: Primary | ICD-10-CM

## 2023-09-06 PROCEDURE — 99999 PR PBB SHADOW E&M-EST. PATIENT-LVL I: ICD-10-PCS | Mod: PBBFAC,,,

## 2023-09-06 PROCEDURE — 99407 PR TOBACCO USE CESSATION INTENSIVE >10 MINUTES: ICD-10-PCS | Mod: S$GLB,,,

## 2023-09-06 PROCEDURE — 99999 PR PBB SHADOW E&M-EST. PATIENT-LVL I: CPT | Mod: PBBFAC,,,

## 2023-09-06 PROCEDURE — 99407 BEHAV CHNG SMOKING > 10 MIN: CPT | Mod: S$GLB,,,

## 2023-09-06 NOTE — PROGRESS NOTES
Called pt to f/u on his 3 month smoking cessation quit status. Pt stated he is still smoking, but has cut back. Informed him he has benefits available and is able to rejoin. Pt not ready to make appointment. He will call back when ready. Informed him of benefit period, phone follow ups, and contact information. Will complete smart form and will continue to follow up on quit #1 episode.

## 2024-06-03 ENCOUNTER — TELEPHONE (OUTPATIENT)
Dept: SMOKING CESSATION | Facility: CLINIC | Age: 44
End: 2024-06-03
Payer: MEDICARE

## 2024-06-03 NOTE — TELEPHONE ENCOUNTER
Called patient about 12 month follow up. Left message for patient to call [phone]. Resolved quit episode.

## 2025-01-14 DIAGNOSIS — S82.122S CLOSED FRACTURE OF LATERAL PORTION OF LEFT TIBIAL PLATEAU, SEQUELA: Primary | ICD-10-CM

## 2025-01-24 ENCOUNTER — HOSPITAL ENCOUNTER (OUTPATIENT)
Dept: RADIOLOGY | Facility: HOSPITAL | Age: 45
Discharge: HOME | End: 2025-01-24
Attending: ORTHOPAEDIC SURGERY
Payer: MEDICARE

## 2025-01-24 ENCOUNTER — OFFICE VISIT (OUTPATIENT)
Dept: ORTHOPEDICS | Facility: CLINIC | Age: 45
End: 2025-01-24
Payer: MEDICARE

## 2025-01-24 VITALS
WEIGHT: 315 LBS | SYSTOLIC BLOOD PRESSURE: 126 MMHG | HEIGHT: 78 IN | DIASTOLIC BLOOD PRESSURE: 82 MMHG | BODY MASS INDEX: 36.45 KG/M2 | TEMPERATURE: 99 F

## 2025-01-24 DIAGNOSIS — M25.562 CHRONIC PAIN OF LEFT KNEE: ICD-10-CM

## 2025-01-24 DIAGNOSIS — G89.29 CHRONIC PAIN OF LEFT KNEE: ICD-10-CM

## 2025-01-24 DIAGNOSIS — S82.142A CLOSED FRACTURE OF LEFT TIBIAL PLATEAU, INITIAL ENCOUNTER: Primary | ICD-10-CM

## 2025-01-24 PROCEDURE — 27530 TREAT KNEE FRACTURE: CPT | Mod: PBBFAC | Performed by: ORTHOPAEDIC SURGERY

## 2025-01-24 PROCEDURE — 99203 OFFICE O/P NEW LOW 30 MIN: CPT | Mod: S$PBB,57,, | Performed by: ORTHOPAEDIC SURGERY

## 2025-01-24 PROCEDURE — 1159F MED LIST DOCD IN RCRD: CPT | Mod: CPTII,,, | Performed by: ORTHOPAEDIC SURGERY

## 2025-01-24 PROCEDURE — 3079F DIAST BP 80-89 MM HG: CPT | Mod: CPTII,,, | Performed by: ORTHOPAEDIC SURGERY

## 2025-01-24 PROCEDURE — 27530 TREAT KNEE FRACTURE: CPT | Mod: S$PBB,LT,, | Performed by: ORTHOPAEDIC SURGERY

## 2025-01-24 PROCEDURE — 3008F BODY MASS INDEX DOCD: CPT | Mod: CPTII,,, | Performed by: ORTHOPAEDIC SURGERY

## 2025-01-24 PROCEDURE — 99213 OFFICE O/P EST LOW 20 MIN: CPT | Mod: PBBFAC,25

## 2025-01-24 PROCEDURE — 73564 X-RAY EXAM KNEE 4 OR MORE: CPT | Mod: TC,LT

## 2025-01-24 PROCEDURE — 3074F SYST BP LT 130 MM HG: CPT | Mod: CPTII,,, | Performed by: ORTHOPAEDIC SURGERY

## 2025-01-24 RX ORDER — OXYCODONE AND ACETAMINOPHEN 7.5; 325 MG/1; MG/1
1 TABLET ORAL EVERY 6 HOURS PRN
Qty: 28 TABLET | Refills: 0 | Status: SHIPPED | OUTPATIENT
Start: 2025-01-24 | End: 2025-01-31

## 2025-01-24 NOTE — PROGRESS NOTES
Subjective:      Sedrick Chatman is a 44 y.o. male emergency room for evaluation and treatment of left knee pain. This is evaluated as a personal injury. The pain began 3 week ago. The pain's location is lateral. He describes the symptoms as  severe. .  The patient fell when a ladder broke that he was standing on.  Reports the latter was rated for 250 lb and he weighs approximately 350 lb.  He was seen in the emergency room and found to have a lateral tibial plateau fracture.  He is complaining of fairly severe pain.  His only complaint is about his knee.  He does feel some instability.    Outside reports reviewed:  X-ray images.          Objective:         General :   appears stated age and cooperative   Gait: Unable to ambulate due to fracture. The patient cannot bear weight on the injured extremity.   Left Lower Extremity          Knee Effusion:  0-1+   Ecchymosis:  Moderate amount   Knee ROM:  Not tested due to fracture   Patella:  Mildly tender   Tenderness: lateral joint line   Stability:  Not testable due to fracture               Pulses: normal DP and PT pulses are 1+     Imaging  X-rays: 4 views of the knee demonstrate a lateral tibial plateau fracture.  Emergency room x-rays showed nondisplaced.  With a congruent joint line.  Those done today showed a minimal resection of 2 mm.  But he does have advanced osteoarthritis of the lateral side of that knee.    CT scan done on January 6-0 the tibial plateau fracture laterally.  Again the joint is congruent.  Confirms advanced osteoarthritis.     Assessment:      DJDleft knee.  Lateral tibial plateau fracture.     Plan:     We will place him in a hinged knee brace.  I had an extensive discussion with him regarding this.  Advise him that he has advanced osteoarthritis of the knee.  That he does have a slight step-off now but that should not be an issue if he heals.  I explained to him that there is a very good chance he will continue to hurt due to the  osteoarthritis.  He is likely to need a knee replacement fairly soon.  He asked about just doing it now.  I advised him the fracture as the heel 1st.  We did call in 7 days of Percocet for him.  We will place him in a double upright hinged knee brace and allow 0 30°.  We will check him again in 2 weeks.  If it displaces further he may need an ORIF.  He wanted to proceed with surgery now but I advised him that this will not decrease his pain or speed is healing.  It is only to get the bones back in position and hold them in place.  He was not happy with this explanation, but I did explain to much of this is due to his advanced osteoarthritis.  He asked if he could have a torn ACL.  I advised him that is possible but that would not be reconstructed with the advanced osteoarthritis.  I again explained to him that we let the fracture heal.  And then see how he does pain wise and consider referring him for a total knee arthroplasty.

## 2025-02-03 ENCOUNTER — TELEPHONE (OUTPATIENT)
Dept: ORTHOPEDICS | Facility: CLINIC | Age: 45
End: 2025-02-03
Payer: MEDICARE

## 2025-02-03 NOTE — TELEPHONE ENCOUNTER
Spoke to Dr. Yanez regarding this patient. He states he will contact Dr Avilez after clinic. Thanks.

## 2025-02-03 NOTE — TELEPHONE ENCOUNTER
Dr Avilez called on behalf of patient, patient is currently out of his Percocet and needs a refill. She states she would like you to call her back regarding patient's pain medication as she would like to discuss how the refill process will be handled with this patient. Her number is [phone]

## 2025-02-04 DIAGNOSIS — S82.142A CLOSED FRACTURE OF LEFT TIBIAL PLATEAU, INITIAL ENCOUNTER: Primary | ICD-10-CM

## 2025-02-04 RX ORDER — OXYCODONE AND ACETAMINOPHEN 7.5; 325 MG/1; MG/1
1 TABLET ORAL EVERY 6 HOURS PRN
Qty: 28 TABLET | Refills: 0 | Status: SHIPPED | OUTPATIENT
Start: 2025-02-04 | End: 2025-02-12 | Stop reason: SDUPTHER

## 2025-02-04 RX ORDER — OXYCODONE AND ACETAMINOPHEN 7.5; 325 MG/1; MG/1
1 TABLET ORAL EVERY 6 HOURS PRN
COMMUNITY
End: 2025-02-04 | Stop reason: SDUPTHER

## 2025-02-12 ENCOUNTER — OFFICE VISIT (OUTPATIENT)
Dept: ORTHOPEDICS | Facility: CLINIC | Age: 45
End: 2025-02-12
Payer: MEDICARE

## 2025-02-12 ENCOUNTER — HOSPITAL ENCOUNTER (OUTPATIENT)
Dept: RADIOLOGY | Facility: HOSPITAL | Age: 45
Discharge: HOME | End: 2025-02-12
Attending: ORTHOPAEDIC SURGERY
Payer: MEDICARE

## 2025-02-12 VITALS
TEMPERATURE: 98 F | BODY MASS INDEX: 36.45 KG/M2 | SYSTOLIC BLOOD PRESSURE: 134 MMHG | HEIGHT: 78 IN | WEIGHT: 315 LBS | HEART RATE: 91 BPM | DIASTOLIC BLOOD PRESSURE: 92 MMHG

## 2025-02-12 DIAGNOSIS — M25.562 LEFT KNEE PAIN, UNSPECIFIED CHRONICITY: ICD-10-CM

## 2025-02-12 DIAGNOSIS — M25.562 LEFT KNEE PAIN, UNSPECIFIED CHRONICITY: Primary | ICD-10-CM

## 2025-02-12 DIAGNOSIS — S82.142A CLOSED FRACTURE OF LEFT TIBIAL PLATEAU, INITIAL ENCOUNTER: ICD-10-CM

## 2025-02-12 PROCEDURE — 73564 X-RAY EXAM KNEE 4 OR MORE: CPT | Mod: TC,LT

## 2025-02-12 PROCEDURE — 99213 OFFICE O/P EST LOW 20 MIN: CPT | Mod: PBBFAC,25

## 2025-02-12 RX ORDER — OXYCODONE AND ACETAMINOPHEN 7.5; 325 MG/1; MG/1
1 TABLET ORAL EVERY 6 HOURS PRN
Qty: 28 TABLET | Refills: 0 | Status: SHIPPED | OUTPATIENT
Start: 2025-02-12 | End: 2025-02-19

## 2025-02-12 NOTE — PROGRESS NOTES
Faculty Attestation: Sedrick Chatman  was seen at [facility] in the Orthopaedic Clinic in the outpatient department at a [facility]. Discussed with the resident at the time of the visit.  I participated in the management of the patient and was immediately available throughout the encounter. History of Present Illness, Physical Exam, and Assessment and Plan reviewed. Treatment plan is reasonable and appropriate. Compliance with treatment recommendations is important. No procedure was performed.     Joseph Yanez MD  Orthopedic Surgery Chief

## 2025-02-12 NOTE — PROGRESS NOTES
Subjective:      Sedrick Chatman is a 44 y.o. male emergency room for evaluation and treatment of left knee pain. This is evaluated as a personal injury. The pain began 3 week ago. The pain's location is lateral. He describes the symptoms as  severe. .  The patient fell when a ladder broke that he was standing on.  Reports the latter was rated for 250 lb and he weighs approximately 350 lb.  He was seen in the emergency room and found to have a lateral tibial plateau fracture.  He is complaining of fairly severe pain.  His only complaint is about his knee.  He does feel some instability.    Interval 02/12/2025   Patient states that he is still having pain in the knee when he tries to move it.  He has been in the brace locked in extension.  He has been nonweightbearing with crutches as much as possible.  He is concerned about an internal derangement of the knee and says he feels like something shifts sometimes.           Objective:         Left lower extremity   Swelling about the knee   TTP over the lateral joint line   Range of motion 0 to about 30° before pain   Ligamentous exam deferred secondary to injury   Motor intact   [place]   Foot warm well perfused    Imaging  X-rays:  Left knee x-rays obtained today demonstrate lateral tibial plateau fracture with minimal joint depression.  No significant change compared to previous imaging.     Assessment:     Left knee lateral tibial plateau fracture with minimal displacement, degenerative changes     Plan:     We discussed that his x-rays demonstrate no significant change in the position of the fracture.  He again asked about potential ACL injury or total knee arthroplasty.  We discussed that prior to any consideration of either of these procedures we would need the bone to be healed and for him to have good range of motion, although I do not think he is a candidate for ACL reconstruction given his degenerative changes.  He is currently very stiff when he has been in  the brace fully extended.  We will unlock the brace today and get him into physical therapy for range of motion.  We will keep him nonweightbearing for another 2 weeks for 8 weeks total nonweightbearing from the date of injury.  He will begin weight-bearing as tolerated at that point.  We will see him back in six weeks with repeat x-rays of the left knee.

## 2025-02-25 ENCOUNTER — TELEPHONE (OUTPATIENT)
Dept: ORTHOPEDICS | Facility: CLINIC | Age: 45
End: 2025-02-25
Payer: MEDICARE

## 2025-02-25 NOTE — TELEPHONE ENCOUNTER
Patient called wanting a refill of pain medication     Spoke with patient he has started PT going twice a week at [facility] in [place] and is asking for a refill of pain medication to continue with therapy.  Would like for it to got to [facility] on [address] which is on file

## 2025-02-26 DIAGNOSIS — S82.142A CLOSED FRACTURE OF LEFT TIBIAL PLATEAU, INITIAL ENCOUNTER: Primary | ICD-10-CM

## 2025-02-26 RX ORDER — HYDROCODONE BITARTRATE AND ACETAMINOPHEN 5; 325 MG/1; MG/1
1 TABLET ORAL EVERY 6 HOURS PRN
Qty: 20 TABLET | Refills: 0 | Status: SHIPPED | OUTPATIENT
Start: 2025-02-26 | End: 2025-03-03

## 2025-03-26 ENCOUNTER — OFFICE VISIT (OUTPATIENT)
Dept: ORTHOPEDICS | Facility: CLINIC | Age: 45
End: 2025-03-26
Payer: MEDICARE

## 2025-03-26 ENCOUNTER — HOSPITAL ENCOUNTER (OUTPATIENT)
Dept: RADIOLOGY | Facility: HOSPITAL | Age: 45
Discharge: HOME | End: 2025-03-26
Attending: ORTHOPAEDIC SURGERY
Payer: MEDICARE

## 2025-03-26 VITALS
DIASTOLIC BLOOD PRESSURE: 100 MMHG | HEIGHT: 78 IN | BODY MASS INDEX: 36.45 KG/M2 | TEMPERATURE: 98 F | WEIGHT: 315 LBS | SYSTOLIC BLOOD PRESSURE: 145 MMHG

## 2025-03-26 DIAGNOSIS — G89.29 CHRONIC PAIN OF LEFT KNEE: Primary | ICD-10-CM

## 2025-03-26 DIAGNOSIS — M25.562 CHRONIC PAIN OF LEFT KNEE: Primary | ICD-10-CM

## 2025-03-26 DIAGNOSIS — G89.29 CHRONIC PAIN OF LEFT KNEE: ICD-10-CM

## 2025-03-26 DIAGNOSIS — M25.562 CHRONIC PAIN OF LEFT KNEE: ICD-10-CM

## 2025-03-26 PROCEDURE — 99213 OFFICE O/P EST LOW 20 MIN: CPT | Mod: PBBFAC,25

## 2025-03-26 PROCEDURE — 73564 X-RAY EXAM KNEE 4 OR MORE: CPT | Mod: TC,LT

## 2025-03-26 NOTE — PROGRESS NOTES
Subjective:      Sedrick Chatman is a 44 y.o. male emergency room for evaluation and treatment of left knee pain. This is evaluated as a personal injury. The pain began 3 week ago. The pain's location is lateral. He describes the symptoms as  severe. .  The patient fell when a ladder broke that he was standing on.  Reports the latter was rated for 250 lb and he weighs approximately 350 lb.  He was seen in the emergency room and found to have a lateral tibial plateau fracture.  He is complaining of fairly severe pain.  His only complaint is about his knee.  He does feel some instability.    Interval 02/12/2025   Patient states that he is still having pain in the knee when he tries to move it.  He has been in the brace locked in extension.  He has been nonweightbearing with crutches as much as possible.  He is concerned about an internal derangement of the knee and says he feels like something shifts sometimes.    Interval 03/26/2025   Patient returns today.  He still has some pain in the knee.  He has been walking with crutches.  He went to therapy a few times but has been unable to go due to financial reasons.  He is trying to work on range of motion on his own.         Objective:         Left lower extremity   Mild swelling about the knee   Mild tenderness laterally   Range of motion 5 to about 90° before pain   Motor intact   [place]   Foot warm well perfused    Imaging  X-rays:  Left knee x-rays obtained today demonstrate lateral tibial plateau fracture with minimal joint depression.  No significant change compared to previous imaging.     Assessment:     Left knee lateral tibial plateau fracture with minimal displacement, degenerative changes     Plan:     At this point patient is about 12 weeks out from the injury.  He still has pain in the knee.  He does have degenerative changes.  We will obtain a CT scan to evaluate for fracture healing.  We discussed that if the CT scan shows that the fracture is healed we  would then recommend treatment for the arthritis as this would likely be the source of his pain. He will follow up after the CT scan     Leo Collier

## 2025-03-26 NOTE — PROGRESS NOTES
Faculty Attestation: Sedrick Chatman  was seen at [facility] in the Orthopaedic Clinic. Discussed with the resident at the time of the visit. History of Present Illness, Physical Exam, and Assessment and Plan reviewed. Treatment plan is reasonable and appropriate. Compliance with treatment recommendations is important. No procedure was performed.     Krystian Hitchcock MD  Orthopaedic Surgery

## 2025-03-28 ENCOUNTER — HOSPITAL ENCOUNTER (OUTPATIENT)
Dept: RADIOLOGY | Facility: HOSPITAL | Age: 45
Discharge: HOME | End: 2025-03-28
Attending: STUDENT IN AN ORGANIZED HEALTH CARE EDUCATION/TRAINING PROGRAM
Payer: MEDICARE

## 2025-03-28 DIAGNOSIS — M25.562 CHRONIC PAIN OF LEFT KNEE: ICD-10-CM

## 2025-03-28 DIAGNOSIS — G89.29 CHRONIC PAIN OF LEFT KNEE: ICD-10-CM

## 2025-03-28 PROCEDURE — 73700 CT LOWER EXTREMITY W/O DYE: CPT | Mod: TC,LT

## 2025-04-14 ENCOUNTER — OFFICE VISIT (OUTPATIENT)
Dept: ORTHOPEDICS | Facility: CLINIC | Age: 45
End: 2025-04-14
Payer: MEDICARE

## 2025-04-14 VITALS
TEMPERATURE: 98 F | SYSTOLIC BLOOD PRESSURE: 135 MMHG | HEIGHT: 78 IN | DIASTOLIC BLOOD PRESSURE: 96 MMHG | BODY MASS INDEX: 36.45 KG/M2 | WEIGHT: 315 LBS | HEART RATE: 90 BPM

## 2025-04-14 DIAGNOSIS — S82.142A CLOSED FRACTURE OF LEFT TIBIAL PLATEAU, INITIAL ENCOUNTER: Primary | ICD-10-CM

## 2025-04-14 DIAGNOSIS — M17.32: ICD-10-CM

## 2025-04-14 PROCEDURE — 99214 OFFICE O/P EST MOD 30 MIN: CPT | Mod: PBBFAC

## 2025-04-14 RX ORDER — TRAMADOL HYDROCHLORIDE 50 MG/1
50 TABLET ORAL EVERY 8 HOURS PRN
Qty: 30 TABLET | Refills: 0 | Status: SHIPPED | OUTPATIENT
Start: 2025-04-14

## 2025-04-14 NOTE — PROGRESS NOTES
Orthopedic surgery clinic progress note    Subjective:      Sedrick Chatman is a 44 y.o. male emergency room for evaluation and treatment of left knee pain. This is evaluated as a personal injury. The pain began 3 week ago. The pain's location is lateral. He describes the symptoms as  severe. .  The patient fell when a ladder broke that he was standing on.  Reports the latter was rated for 250 lb and he weighs approximately 350 lb.  He was seen in the emergency room and found to have a lateral tibial plateau fracture.  He is complaining of fairly severe pain.  His only complaint is about his knee.  He does feel some instability.    Interval 02/12/2025   Patient states that he is still having pain in the knee when he tries to move it.  He has been in the brace locked in extension.  He has been nonweightbearing with crutches as much as possible.  He is concerned about an internal derangement of the knee and says he feels like something shifts sometimes.    Interval 03/26/2025   Patient returns today.  He still has some pain in the knee.  He has been walking with crutches.  He went to therapy a few times but has been unable to go due to financial reasons.  He is trying to work on range of motion on his own.    Interval 04/14/2025     Patient returns today for re-evaluation, pain is about the same in his knee, he underwent CT of the left knee in the interim to evaluate healing.     Objective:         Left lower extremity   Mild swelling about the knee   Mild tenderness laterally   Range of motion 5 to about 90° before pain   Varus valgus deferred due to known fracture  Neurovascularly intact      Imaging  CT left knee demonstrates minimal depression of articular fragments of lateral tibial plateau fracture, fracture line still visible with some small areas of visible osseous fusion       Assessment:     44-year-old male with left tibial plateau fracture managed nonoperatively with delayed union, also with underlying  tricompartmental left knee osteoarthritis.     Plan:     -we discussed that he does have a difficult problem given that he does have arthritis in the left knee and delayed fracture union in the left knee although there are measures we can take to promote fracture healing including bone stimulator, vitamin-D supplementation and continued activity restrictions, if he does undergo open reduction internal fixation for nonunion, this may fix his fracture although may not solve his pain, due to the fact that he has underlying arthritis and may require additional surgeries for arthroplasty and hardware removal, he understands this and would like to continue with nonoperative management for his plateau fracture at this point  -referral to pain management to evaluate if there are other options to address his left knee pain while awaiting fracture union and prior to evaluation by arthroplasty colleagues  -referral for evaluation for left total knee arthroplasty        Will Black, PGY 5   [employer] Orthopedic surgery

## 2025-04-15 NOTE — PROGRESS NOTES
Faculty Attestation: Sedrick Chatman  was seen at [facility] in the Orthopaedic Clinic in the outpatient department at a [facility]. Patient seen and evaluated at the time of the visit.  I participated in the management of the patient and was immediately available throughout the encounter. History of Present Illness, Physical Exam, and Assessment and Plan reviewed. Treatment plan is reasonable and appropriate. Compliance with treatment recommendations is important. No procedure was performed.     Joseph Yanez MD  Orthopedic Surgery Chief